# Patient Record
Sex: MALE | Race: WHITE | NOT HISPANIC OR LATINO | Employment: OTHER | URBAN - METROPOLITAN AREA
[De-identification: names, ages, dates, MRNs, and addresses within clinical notes are randomized per-mention and may not be internally consistent; named-entity substitution may affect disease eponyms.]

---

## 2021-07-09 ENCOUNTER — HOSPITAL ENCOUNTER (OUTPATIENT)
Dept: CT IMAGING | Facility: CLINIC | Age: 76
Discharge: HOME | End: 2021-07-09
Payer: COMMERCIAL

## 2021-07-09 DIAGNOSIS — Z13.9 ENCOUNTER FOR SCREENING: ICD-10-CM

## 2021-07-09 LAB
CV CALCIUM SCORE AGATSTON LM: 0
CV CALCIUM SCORING AGATSON LAD: 9
CV CALCIUM SCORING AGATSTON CX: 0
CV CALCIUM SCORING AGATSTON RCA: 0
CV CALCIUM SCORING AGATSTON TOTAL: 9

## 2021-08-08 ENCOUNTER — HEALTH MAINTENANCE LETTER (OUTPATIENT)
Age: 76
End: 2021-08-08

## 2021-10-03 ENCOUNTER — HEALTH MAINTENANCE LETTER (OUTPATIENT)
Age: 76
End: 2021-10-03

## 2022-09-11 ENCOUNTER — HEALTH MAINTENANCE LETTER (OUTPATIENT)
Age: 77
End: 2022-09-11

## 2023-06-05 ENCOUNTER — APPOINTMENT (OUTPATIENT)
Dept: RADIOLOGY | Facility: CLINIC | Age: 78
End: 2023-06-05
Attending: EMERGENCY MEDICINE
Payer: COMMERCIAL

## 2023-06-05 ENCOUNTER — HOSPITAL ENCOUNTER (EMERGENCY)
Facility: CLINIC | Age: 78
Discharge: HOME | End: 2023-06-06
Attending: EMERGENCY MEDICINE | Admitting: EMERGENCY MEDICINE
Payer: COMMERCIAL

## 2023-06-05 DIAGNOSIS — I48.91 ATRIAL FIBRILLATION WITH RVR (H): ICD-10-CM

## 2023-06-05 LAB
ALBUMIN UR-MCNC: NEGATIVE MG/DL
APPEARANCE UR: CLEAR
ATRIAL RATE - MUSE: 416 BPM
ATRIAL RATE - MUSE: 441 BPM
BASOPHILS # BLD AUTO: 0 10E3/UL (ref 0–0.2)
BASOPHILS NFR BLD AUTO: 0 %
BILIRUB UR QL STRIP: NEGATIVE
COLOR UR AUTO: YELLOW
D DIMER PPP FEU-MCNC: <=0.27 UG/ML FEU (ref 0–0.5)
DIASTOLIC BLOOD PRESSURE - MUSE: 77 MMHG
DIASTOLIC BLOOD PRESSURE - MUSE: 82 MMHG
EOSINOPHIL # BLD AUTO: 0.2 10E3/UL (ref 0–0.7)
EOSINOPHIL NFR BLD AUTO: 2 %
ERYTHROCYTE [DISTWIDTH] IN BLOOD BY AUTOMATED COUNT: 12.7 % (ref 10–15)
GLUCOSE UR STRIP-MCNC: NEGATIVE MG/DL
HCT VFR BLD AUTO: 43.8 % (ref 40–53)
HGB BLD-MCNC: 15.2 G/DL (ref 13.3–17.7)
HGB UR QL STRIP: NEGATIVE
IMM GRANULOCYTES # BLD: 0 10E3/UL
IMM GRANULOCYTES NFR BLD: 0 %
INR PPP: 0.94 (ref 0.85–1.15)
INTERPRETATION ECG - MUSE: NORMAL
INTERPRETATION ECG - MUSE: NORMAL
KETONES UR STRIP-MCNC: NEGATIVE MG/DL
LEUKOCYTE ESTERASE UR QL STRIP: NEGATIVE
LYMPHOCYTES # BLD AUTO: 4.2 10E3/UL (ref 0.8–5.3)
LYMPHOCYTES NFR BLD AUTO: 42 %
MCH RBC QN AUTO: 31.7 PG (ref 26.5–33)
MCHC RBC AUTO-ENTMCNC: 34.7 G/DL (ref 31.5–36.5)
MCV RBC AUTO: 91 FL (ref 78–100)
MONOCYTES # BLD AUTO: 0.7 10E3/UL (ref 0–1.3)
MONOCYTES NFR BLD AUTO: 7 %
MUCOUS THREADS #/AREA URNS LPF: PRESENT /LPF
NEUTROPHILS # BLD AUTO: 4.8 10E3/UL (ref 1.6–8.3)
NEUTROPHILS NFR BLD AUTO: 49 %
NITRATE UR QL: NEGATIVE
NRBC # BLD AUTO: 0 10E3/UL
NRBC BLD AUTO-RTO: 0 /100
P AXIS - MUSE: NORMAL DEGREES
P AXIS - MUSE: NORMAL DEGREES
PH UR STRIP: 5.5 [PH] (ref 5–7)
PLATELET # BLD AUTO: 247 10E3/UL (ref 150–450)
PR INTERVAL - MUSE: NORMAL MS
PR INTERVAL - MUSE: NORMAL MS
QRS DURATION - MUSE: 96 MS
QRS DURATION - MUSE: 96 MS
QT - MUSE: 344 MS
QT - MUSE: 368 MS
QTC - MUSE: 442 MS
QTC - MUSE: 452 MS
R AXIS - MUSE: 3 DEGREES
R AXIS - MUSE: 8 DEGREES
RBC # BLD AUTO: 4.8 10E6/UL (ref 4.4–5.9)
RBC URINE: <1 /HPF
SP GR UR STRIP: 1.02 (ref 1–1.03)
SQUAMOUS EPITHELIAL: <1 /HPF
SYSTOLIC BLOOD PRESSURE - MUSE: 137 MMHG
SYSTOLIC BLOOD PRESSURE - MUSE: 173 MMHG
T AXIS - MUSE: 51 DEGREES
T AXIS - MUSE: 72 DEGREES
TROPONIN I SERPL-MCNC: 0.01 NG/ML (ref 0–0.29)
TSH SERPL DL<=0.005 MIU/L-ACNC: 1.12 UIU/ML (ref 0.3–5)
UROBILINOGEN UR STRIP-MCNC: 2 MG/DL
VENTRICULAR RATE- MUSE: 104 BPM
VENTRICULAR RATE- MUSE: 87 BPM
WBC # BLD AUTO: 10 10E3/UL (ref 4–11)
WBC URINE: 2 /HPF

## 2023-06-05 PROCEDURE — 36415 COLL VENOUS BLD VENIPUNCTURE: CPT | Performed by: EMERGENCY MEDICINE

## 2023-06-05 PROCEDURE — 93005 ELECTROCARDIOGRAM TRACING: CPT | Performed by: EMERGENCY MEDICINE

## 2023-06-05 PROCEDURE — 71046 X-RAY EXAM CHEST 2 VIEWS: CPT

## 2023-06-05 PROCEDURE — 250N000013 HC RX MED GY IP 250 OP 250 PS 637: Performed by: EMERGENCY MEDICINE

## 2023-06-05 PROCEDURE — 81001 URINALYSIS AUTO W/SCOPE: CPT | Performed by: EMERGENCY MEDICINE

## 2023-06-05 PROCEDURE — 85379 FIBRIN DEGRADATION QUANT: CPT | Performed by: EMERGENCY MEDICINE

## 2023-06-05 PROCEDURE — 250N000009 HC RX 250: Performed by: EMERGENCY MEDICINE

## 2023-06-05 PROCEDURE — 99285 EMERGENCY DEPT VISIT HI MDM: CPT | Mod: 25

## 2023-06-05 PROCEDURE — 84484 ASSAY OF TROPONIN QUANT: CPT | Performed by: EMERGENCY MEDICINE

## 2023-06-05 PROCEDURE — 85014 HEMATOCRIT: CPT | Performed by: EMERGENCY MEDICINE

## 2023-06-05 PROCEDURE — 83735 ASSAY OF MAGNESIUM: CPT | Performed by: EMERGENCY MEDICINE

## 2023-06-05 PROCEDURE — 85610 PROTHROMBIN TIME: CPT | Performed by: EMERGENCY MEDICINE

## 2023-06-05 PROCEDURE — 80048 BASIC METABOLIC PNL TOTAL CA: CPT | Performed by: EMERGENCY MEDICINE

## 2023-06-05 PROCEDURE — 96374 THER/PROPH/DIAG INJ IV PUSH: CPT

## 2023-06-05 PROCEDURE — 84443 ASSAY THYROID STIM HORMONE: CPT | Performed by: EMERGENCY MEDICINE

## 2023-06-05 RX ORDER — METOPROLOL TARTRATE 25 MG/1
25 TABLET, FILM COATED ORAL 2 TIMES DAILY
Qty: 60 TABLET | Refills: 0 | Status: SHIPPED | OUTPATIENT
Start: 2023-06-05 | End: 2023-06-14

## 2023-06-05 RX ORDER — METOPROLOL TARTRATE 25 MG/1
50 TABLET, FILM COATED ORAL ONCE
Status: COMPLETED | OUTPATIENT
Start: 2023-06-05 | End: 2023-06-05

## 2023-06-05 RX ORDER — METOPROLOL TARTRATE 1 MG/ML
5 INJECTION, SOLUTION INTRAVENOUS ONCE
Status: COMPLETED | OUTPATIENT
Start: 2023-06-05 | End: 2023-06-05

## 2023-06-05 RX ORDER — LISINOPRIL AND HYDROCHLOROTHIAZIDE 12.5; 2 MG/1; MG/1
2 TABLET ORAL DAILY
COMMUNITY
Start: 2023-01-18 | End: 2024-07-10

## 2023-06-05 RX ORDER — ASPIRIN 325 MG
325 TABLET ORAL ONCE
Status: COMPLETED | OUTPATIENT
Start: 2023-06-05 | End: 2023-06-05

## 2023-06-05 RX ADMIN — METOPROLOL TARTRATE 50 MG: 25 TABLET, FILM COATED ORAL at 23:11

## 2023-06-05 RX ADMIN — ASPIRIN 325 MG ORAL TABLET 325 MG: 325 PILL ORAL at 21:40

## 2023-06-05 RX ADMIN — METOPROLOL TARTRATE 5 MG: 5 INJECTION INTRAVENOUS at 21:40

## 2023-06-05 ASSESSMENT — ACTIVITIES OF DAILY LIVING (ADL)
ADLS_ACUITY_SCORE: 35
ADLS_ACUITY_SCORE: 35

## 2023-06-06 VITALS
OXYGEN SATURATION: 97 % | HEART RATE: 79 BPM | WEIGHT: 240 LBS | DIASTOLIC BLOOD PRESSURE: 70 MMHG | TEMPERATURE: 97.5 F | SYSTOLIC BLOOD PRESSURE: 123 MMHG | RESPIRATION RATE: 17 BRPM

## 2023-06-06 LAB
ANION GAP SERPL CALCULATED.3IONS-SCNC: 11 MMOL/L (ref 5–18)
BUN SERPL-MCNC: 16 MG/DL (ref 8–28)
CALCIUM SERPL-MCNC: 9.7 MG/DL (ref 8.5–10.5)
CHLORIDE BLD-SCNC: 102 MMOL/L (ref 98–107)
CO2 SERPL-SCNC: 26 MMOL/L (ref 22–31)
CREAT SERPL-MCNC: 1 MG/DL (ref 0.7–1.3)
GFR SERPL CREATININE-BSD FRML MDRD: 78 ML/MIN/1.73M2
GLUCOSE BLD-MCNC: 137 MG/DL (ref 70–125)
MAGNESIUM SERPL-MCNC: 2.1 MG/DL (ref 1.8–2.6)
POTASSIUM BLD-SCNC: 4.4 MMOL/L (ref 3.5–5)
SODIUM SERPL-SCNC: 139 MMOL/L (ref 136–145)

## 2023-06-06 PROCEDURE — 250N000013 HC RX MED GY IP 250 OP 250 PS 637: Performed by: EMERGENCY MEDICINE

## 2023-06-06 RX ADMIN — APIXABAN 5 MG: 5 TABLET, FILM COATED ORAL at 00:06

## 2023-06-06 NOTE — ED PROVIDER NOTES
EMERGENCY DEPARTMENT ENCOUnter      NAME: Daquan Schmitt  AGE: 77 year old male  YOB: 1945  MRN: [id number]  EVALUATION DATE & TIME: 6/5/2023  8:55 PM    PCP: Jeffrey Murillo    ED PROVIDER: Yin Dexter MD      Chief Complaint   Patient presents with     Hypertension         FINAL IMPRESSION:  1. Atrial fibrillation with RVR (H)          ED COURSE & MEDICAL DECISION MAKING:      In summary, the patient is a 77-year-old male that presents to the emergency department for evaluation of a rapid heart rate thought secondary to new atrial fibrillation with a rapid ventricular response.  Rate control was achieved in the emergency department, however the patient remained in atrial fibrillation.  Since it is unclear when his atrial fibrillation started, I do not think he is a candidate for emergent cardioversion at this time.  He has no evidence of ACS, thromboembolism, or thyroid dysfunction that would trigger his atrial fibrillation.   CHADS2 score was 2, hence we will initiate anticoagulant therapy with eliquis.  9:07 PM I met with the patient for an interview and initial exam. Plans for treatment were discussed.  Lopressor 5 mg IV was administered.  Aspirin 325 mg p.o. was administered.  Previous medical records were reviewed, including nurse call notes.  11:30 AM Discussed anticoagulation therapy with patient, which he agrees to initiate.  Eliquis 5 mg po administered.  Lopressor 50 mg p.o. was administered.  12:28 AM I updated the patient regarding her lab, EKG, and imaging findings. Plans for discharge were discussed. He is comfortable with the plan    Medical Decision Making    History:    Supplemental history from: Documented in chart, if applicable    External Record(s) reviewed: Documented in chart, if applicable. and Outpatient Record: [facility] 01/18/2023    Work Up:    Chart documentation includes differential considered and any EKGs or imaging  independently interpreted by provider, where specified.    In additional to work up documented, I considered the following work up: Documented in chart, if applicable.    External consultation:    Discussion of management with another provider: Documented in chart, if applicable    Complicating factors:    Care impacted by chronic illness: Hypertension    Care affected by social determinants of health: N/A    Disposition considerations: Discharge. I prescribed additional prescription strength medication(s) as charted. See documentation for any additional details.          At the conclusion of the encounter I discussed the results of all of the tests and the disposition. The questions were answered. The patient or family acknowledged understanding and was agreeable with the care plan.         MEDICATIONS GIVEN IN THE EMERGENCY:  Medications   metoprolol (LOPRESSOR) injection 5 mg (5 mg Intravenous $Given 6/5/23 2140)   aspirin (ASA) tablet 325 mg (325 mg Oral $Given 6/5/23 2140)   metoprolol tartrate (LOPRESSOR) tablet 50 mg (50 mg Oral $Given 6/5/23 2311)   apixaban ANTICOAGULANT (ELIQUIS) tablet 5 mg (5 mg Oral $Given 6/6/23 0006)       NEW PRESCRIPTIONS STARTED AT TODAY'S ER VISIT  Discharge Medication List as of 6/6/2023 12:39 AM      START taking these medications    Details   apixaban ANTICOAGULANT (ELIQUIS ANTICOAGULANT) 5 MG tablet Take 1 tablet (5 mg) by mouth 2 times daily for 30 days, Disp-60 tablet, R-0, E-Prescribe      metoprolol tartrate (LOPRESSOR) 25 MG tablet Take 1 tablet (25 mg) by mouth 2 times daily for 30 days, Disp-60 tablet, R-0, E-Prescribe                =================================================================    HPI        Daquan Schmitt is a 77 year old male with a pertinent history of hypertension who presents to this ED by walk-in for evaluation of hypertension.    Per chart review, the patient was seen at [facility] on 01/18/2023 for  blood pressure management. He was discontinued on furosemide and lisinopril. Instead, the patient will start lisnopril/ hydrochlorothiaide 2 tablets daily. Patient was discharged and encouraged to notify the hospital back if his blood pressure is not controlled.      The patient was concerned of his elevated heart rate throughout the day with associated symptoms of a mild headache. Otherwise, he reports feeling quite well in the ED. The patient reported having a BP of 120/70 with a HR of 87 earlier today. He notes his HR usually runs around low 50's. The patient proceeded through the day and recorded his BP and HR around 8 PM. BP was elevated at 150/80 with HR of 90s. He recorded his vitals again prior to arrival with a HR of 112. The patient is currently taking lisinopril with a diuretic and cholesterol medication. He also reports taking tylenol and ibuprofen throughout the day for a spine issue. He denied any medical history of a-fib or any other heart illness.     Otherwise, the patient denied having any new pain, shortness of breath, fever, nausea, vomiting, diarrhea, palpitations, and any other medical complaints or concerns at this time.    REVIEW OF SYSTEMS     Constitutional:  Denies fever or chills  HENT:  Denies sore throat   Respiratory:  Denies cough or shortness of breath   Cardiovascular:  Denies chest pain or palpitations  GI:  Denies abdominal pain, nausea, or vomiting  Musculoskeletal:  Denies any new extremity pain   Skin:  Denies rash   Neurologic:  Denies focal weakness or sensory changes. Positive for mild headache  All other systems reviewed and are negative      PAST MEDICAL HISTORY:  History reviewed. No pertinent past medical history.    PAST SURGICAL HISTORY:  History reviewed. No pertinent surgical history.        CURRENT MEDICATIONS:    apixaban ANTICOAGULANT (ELIQUIS ANTICOAGULANT) 5 MG tablet  lisinopril-hydrochlorothiazide (ZESTORETIC) 20-12.5 MG tablet  metoprolol tartrate (LOPRESSOR)  25 MG tablet  finasteride (PROSCAR) 5 MG tablet  pravastatin (PRAVACHOL) 20 MG tablet  terbinafine (LAMISIL) 250 MG tablet        ALLERGIES:  Allergies   Allergen Reactions     Pcn [Penicillins] Diarrhea       FAMILY HISTORY:  No family history on file.    SOCIAL HISTORY:   Social History     Socioeconomic History     Marital status:      Spouse name: None     Number of children: None     Years of education: None     Highest education level: None       VITALS:  Patient Vitals for the past 24 hrs:   BP Temp Temp src Pulse Resp SpO2 Weight   06/06/23 0026 -- -- -- 79 17 97 % --   06/06/23 0011 123/70 -- -- 78 15 96 % --   06/06/23 0000 (!) 143/73 -- -- 80 16 97 % --   06/05/23 2356 -- -- -- 80 19 98 % --   06/05/23 2348 135/62 -- -- 85 26 97 % --   06/05/23 2341 -- -- -- 86 14 97 % --   06/05/23 2330 134/63 -- -- 82 12 96 % --   06/05/23 2300 133/63 -- -- 85 12 97 % --   06/05/23 2245 136/67 -- -- 83 21 98 % --   06/05/23 2230 (!) 163/83 -- -- 84 -- 98 % --   06/05/23 2225 (!) 155/88 -- -- 96 17 92 % --   06/05/23 2200 137/78 -- -- 82 13 98 % --   06/05/23 2155 138/74 -- -- 78 20 99 % --   06/05/23 2150 (!) 144/79 -- -- 78 10 98 % --   06/05/23 2145 (!) 162/91 -- -- 89 -- 97 % --   06/05/23 2140 (!) 149/80 -- -- 96 21 99 % --   06/05/23 2130 (!) 149/82 -- -- 96 19 97 % --   06/05/23 2115 (!) 187/82 -- -- 94 28 99 % --   06/05/23 2100 (!) 173/82 -- -- 102 19 99 % --   06/05/23 2058 (!) 170/83 97.5  F (36.4  C) Oral 101 18 98 % 108.9 kg (240 lb)       PHYSICAL EXAM    Constitutional:  Well developed, Well nourished,  HENT:  Normocephalic, Atraumatic, Bilateral external ears normal, Oropharynx moist, Nose normal.   Neck:  Normal range of motion, No meningismus, No stridor.   Eyes:  EOMI, Conjunctiva normal, No discharge.   Respiratory:  Normal breath sounds, No respiratory distress, No wheezing, No chest tenderness.   Cardiovascular: Tachycardia, irregularly irregular rhythm, No murmurs  GI:  Soft, No tenderness,  No guarding,   Musculoskeletal:  Neurovascularly intact distally, No edema, No tenderness, No cyanosis, Good range of motion in all major joints.   Integument:  Warm, Dry, No erythema, No rash.   Lymphatic:  No lymphadenopathy noted.   Neurologic:  Alert & oriented , Normal motor function,  No focal deficits noted.   Psychiatric:  Affect normal, Judgment normal, Mood normal.      LAB:  All pertinent labs reviewed and interpreted.  Results for orders placed or performed during the hospital encounter of 06/05/23   Chest XR,  PA & LAT    Impression    IMPRESSION: Normal heart size and pulmonary vascularity. Lungs are clear. Mild elevation right hemidiaphragm. No significant bony abnormalities. Chest is otherwise negative. No acute findings.   D dimer quantitative   Result Value Ref Range    D-Dimer Quantitative <=0.27 0.00 - 0.50 ug/mL FEU   Basic metabolic panel   Result Value Ref Range    Sodium 139 136 - 145 mmol/L    Potassium 4.4 3.5 - 5.0 mmol/L    Chloride 102 98 - 107 mmol/L    Carbon Dioxide (CO2) 26 22 - 31 mmol/L    Anion Gap 11 5 - 18 mmol/L    Urea Nitrogen 16 8 - 28 mg/dL    Creatinine 1.00 0.70 - 1.30 mg/dL    Calcium 9.7 8.5 - 10.5 mg/dL    Glucose 137 (H) 70 - 125 mg/dL    GFR Estimate 78 >60 mL/min/1.73m2   Result Value Ref Range    Troponin I 0.01 0.00 - 0.29 ng/mL   Result Value Ref Range    Magnesium 2.1 1.8 - 2.6 mg/dL   UA with Microscopic reflex to Culture    Specimen: Urine, Midstream   Result Value Ref Range    Color Urine Yellow Colorless, Straw, Light Yellow, Yellow    Appearance Urine Clear Clear    Glucose Urine Negative Negative mg/dL    Bilirubin Urine Negative Negative    Ketones Urine Negative Negative mg/dL    Specific Gravity Urine 1.025 1.001 - 1.030    Blood Urine Negative Negative    pH Urine 5.5 5.0 - 7.0    Protein Albumin Urine Negative Negative mg/dL    Urobilinogen Urine 2.0 (A) <2.0 mg/dL    Nitrite Urine Negative Negative    Leukocyte Esterase Urine Negative Negative     Mucus Urine Present (A) None Seen /LPF    RBC Urine <1 <=2 /HPF    WBC Urine 2 <=5 /HPF    Squamous Epithelials Urine <1 <=1 /HPF   Result Value Ref Range    INR 0.94 0.85 - 1.15   TSH with free T4 reflex   Result Value Ref Range    TSH 1.12 0.30 - 5.00 uIU/mL   CBC with platelets and differential   Result Value Ref Range    WBC Count 10.0 4.0 - 11.0 10e3/uL    RBC Count 4.80 4.40 - 5.90 10e6/uL    Hemoglobin 15.2 13.3 - 17.7 g/dL    Hematocrit 43.8 40.0 - 53.0 %    MCV 91 78 - 100 fL    MCH 31.7 26.5 - 33.0 pg    MCHC 34.7 31.5 - 36.5 g/dL    RDW 12.7 10.0 - 15.0 %    Platelet Count 247 150 - 450 10e3/uL    % Neutrophils 49 %    % Lymphocytes 42 %    % Monocytes 7 %    % Eosinophils 2 %    % Basophils 0 %    % Immature Granulocytes 0 %    NRBCs per 100 WBC 0 <1 /100    Absolute Neutrophils 4.8 1.6 - 8.3 10e3/uL    Absolute Lymphocytes 4.2 0.8 - 5.3 10e3/uL    Absolute Monocytes 0.7 0.0 - 1.3 10e3/uL    Absolute Eosinophils 0.2 0.0 - 0.7 10e3/uL    Absolute Basophils 0.0 0.0 - 0.2 10e3/uL    Absolute Immature Granulocytes 0.0 <=0.4 10e3/uL    Absolute NRBCs 0.0 10e3/uL   ECG 12-LEAD WITH MUSE (LHE)   Result Value Ref Range    Systolic Blood Pressure 173 mmHg    Diastolic Blood Pressure 82 mmHg    Ventricular Rate 104 BPM    Atrial Rate 416 BPM    KS Interval  ms    QRS Duration 96 ms     ms    QTc 452 ms    P Axis  degrees    R AXIS 3 degrees    T Axis 72 degrees    Interpretation ECG       Atrial fibrillation with rapid ventricular response  Nonspecific T wave abnormality  Abnormal ECG  No previous ECGs available  Confirmed by SEE ED PROVIDER NOTE FOR, ECG INTERPRETATION ([id number]),  EN NICHOLE ([id number]) on 6/5/2023 11:45:34 PM     ECG 12-LEAD WITH MUSE (LHE)   Result Value Ref Range    Systolic Blood Pressure 137 mmHg    Diastolic Blood Pressure 77 mmHg    Ventricular Rate 87 BPM    Atrial Rate 441 BPM    KS Interval  ms    QRS Duration 96 ms     ms    QTc 442 ms    P Axis  degrees    R AXIS 8  degrees    T Axis 51 degrees    Interpretation ECG       Atrial fibrillation  Abnormal ECG  When compared with ECG of 2023 21:02,  No significant change was found  Confirmed by SEE ED PROVIDER NOTE FOR, ECG INTERPRETATION ([id number]),  EN NICHOLE ([id number]) on 2023 11:45:12 PM         RADIOLOGY:  I have independently reviewed and interpreted the above imaging, pending the final radiology read.  Chest XR,  PA & LAT   Final Result   IMPRESSION: Normal heart size and pulmonary vascularity. Lungs are clear. Mild elevation right hemidiaphragm. No significant bony abnormalities. Chest is otherwise negative. No acute findings.          EK-rate is 104, atrial flutter, no ST segment elevation or depression is appreciated.  No previous EKGs for comparison    -rate is 87, atrial fibrillation, no ST segment elevation or depression appreciated.  Rate is improved from previous EKG.  I have independently reviewed and interpreted this EKG          I, Harley Taylor, am serving as a scribe to document services personally performed by Dr. Dexter based on my observation and the provider's statements to me. I, Yin Dexter MD attest that Harley Taylor is acting in a scribe capacity, has observed my performance of the services and has documented them in accordance with my direction.    Yin Dexter MD  Emergency Medicine  [facility] EMERGENCY ROOM  [address] [id number] [phone]  Dept: [phone]       Yin Dexter MD  [phone]

## 2023-06-06 NOTE — ED TRIAGE NOTES
States he took his bp at home approx 1 hour ago and it was 150s systolic with . Does take bp medications. Noted a headache at that time. Denies chest pain, shortness of breath, vision changes.

## 2023-06-06 NOTE — DISCHARGE INSTRUCTIONS
Continue your other medications as previously prescribed  Return to the ER for worsening problems or concerns

## 2023-06-14 ENCOUNTER — OFFICE VISIT (OUTPATIENT)
Dept: CARDIOLOGY | Facility: CLINIC | Age: 78
End: 2023-06-14
Attending: EMERGENCY MEDICINE
Payer: COMMERCIAL

## 2023-06-14 VITALS
HEART RATE: 76 BPM | WEIGHT: 248 LBS | BODY MASS INDEX: 31.83 KG/M2 | RESPIRATION RATE: 16 BRPM | SYSTOLIC BLOOD PRESSURE: 139 MMHG | DIASTOLIC BLOOD PRESSURE: 84 MMHG | HEIGHT: 74 IN

## 2023-06-14 DIAGNOSIS — I48.91 ATRIAL FIBRILLATION WITH RVR (H): Primary | ICD-10-CM

## 2023-06-14 PROBLEM — H90.3 SENSORINEURAL HEARING LOSS (SNHL) OF BOTH EARS: Status: ACTIVE | Noted: 2018-07-10

## 2023-06-14 PROBLEM — Z91.09 ENVIRONMENTAL ALLERGIES: Status: ACTIVE | Noted: 2023-06-14

## 2023-06-14 PROBLEM — E78.5 HYPERLIPIDEMIA WITH TARGET LDL LESS THAN 100: Status: ACTIVE | Noted: 2023-06-14

## 2023-06-14 PROBLEM — I10 HYPERTENSION: Status: ACTIVE | Noted: 2023-06-14

## 2023-06-14 PROBLEM — G47.33 OSA (OBSTRUCTIVE SLEEP APNEA): Status: ACTIVE | Noted: 2023-06-14

## 2023-06-14 PROCEDURE — 99205 OFFICE O/P NEW HI 60 MIN: CPT | Performed by: INTERNAL MEDICINE

## 2023-06-14 RX ORDER — PRAVASTATIN SODIUM 40 MG
40 TABLET ORAL DAILY
COMMUNITY
Start: 2023-05-24

## 2023-06-14 RX ORDER — ASPIRIN 325 MG
160 TABLET ORAL DAILY
COMMUNITY
End: 2023-07-10

## 2023-06-14 RX ORDER — SILDENAFIL CITRATE 20 MG/1
1 TABLET ORAL DAILY PRN
COMMUNITY
Start: 2023-03-04

## 2023-06-14 RX ORDER — METOPROLOL TARTRATE 25 MG/1
25 TABLET, FILM COATED ORAL 2 TIMES DAILY
Qty: 60 TABLET | Refills: 3 | Status: SHIPPED | OUTPATIENT
Start: 2023-06-14 | End: 2023-08-16 | Stop reason: ALTCHOICE

## 2023-06-14 RX ORDER — UBIDECARENONE 100 MG
100 CAPSULE ORAL DAILY
COMMUNITY

## 2023-06-14 RX ORDER — CETIRIZINE HYDROCHLORIDE 10 MG/1
10 TABLET ORAL DAILY
COMMUNITY

## 2023-06-14 NOTE — LETTER
6/14/2023    Carmen Stout, APRN CNP  [address] [place] [address]    RE: Daquan Schmitt       Dear Colleague,     I had the pleasure of seeing Daquan Schmitt in the [facility].    [facility]  Cardiac Electrophysiology     Consultation Note: Charles Rose MD    Primary Care: Carmen Stout MD    Primary Cardiology:       Thank you, Carmen Maurice, for asking the [facility] Cardiac Arrhythmia care team to see Mr. Daquan Schmitt to evaluate treatment options for atrial fibrillation.    Assessment/Recommendations   Assessment:    Persistent atrial fibrillation: Recently diagnosed on 6/5/2023 after patient picked up increasing his heart rates during his routine daily blood pressure checks.  At this point the patient does not have any direct symptoms (i.e. palpitations) of his arrhythmia but may have some symptoms of fatigue.  The patient's rate is well controlled and he was started appropriately on oral antiarrhythmic drug as he has a [id number] score of 4.  The underlying pathophysiology of his condition was discussed with the patient and his wife in detail.  The rationale for anticoagulation as well as treatment options moving forward was also discussed.  At this point I would like to gather further information regarding the patient's clinical status and then pursue cardioversion if the patient remains persistent.  Obstructive sleep apnea: Chronic problem for the patient who is uniformly compliant with his CPAP therapy.  Coronary atherosclerosis, native vessel: The patient has a coronary calcium score of 9 (LAD) and is on statin therapy.  He most recently had a LDL of just over 100.  I would recommend consideration of additional up titration and/or change of statin agent to try and achieve an LDL of 70 or less.    Plan:  Echocardiogram to assess the patient's cardiac chamber size and function.  24-hour Holter monitor to assess the  "patient's rhythm and heart rate response to activities of daily living.  Follow-up in the arrhythmia clinic with the EP RIGO in 4 to 6 weeks.  Plan for cardioversion after the RIGO visit.  Encouraged the patient to purchase a [employer] mobile rigo to be able to monitor his rhythm post cardioversion.                  History of Present Illness/Subjective    Mr. Daquan Schmitt is a 77 year old male with history of hypertension who follows his blood pressure carefully.  He notes that his typical resting heart rates are in the 50s and that on June 5 he noted his heart rate was in the mid 80s.  Recheck sometime later in the day demonstrated heart rate above 100 and he presented to the emergency room where atrial fibrillation was diagnosed.  Patient was started on low-dose beta-blocker and DOAC (apixaban) that day.  The patient reports that his heart rates have remained much the same range.  The patient has no symptomatic palpitations or direct awareness of his arrhythmia.  He does not have any lightheadedness or dyspnea.  He does possibly have some mild fatigue.  He reports no orthopnea, PND, or ankle edema.    ECG:   Personally reviewed.  Tracing from 6/5/2023 demonstrates atrial fibrillation with elevated ventricular response.  No acute changes.    ECHO:   Dated:   Previous study from 2020 () demonstrated normal LV function and mild increase in left atrial size.    Other Studies:   Coronary calcium study: Score = 9 (LAD)    Time spent: 65 minutes spent on the date of the encounter doing chart review, history and exam, documentation and further activities as noted above.       Physical Examination Review of Systems   /84 (BP Location: Right arm, Patient Position: Sitting, Cuff Size: Adult Large)   Pulse 76   Resp 16   Ht 1.867 m (6' 1.5\")   Wt 112.5 kg (248 lb)   BMI 32.28 kg/m      Wt Readings from Last 3 Encounters:   06/05/23 108.9 kg (240 lb)         General     Appearance:   The patient is alert oriented " to person place and situation.    The patient is in no acute distress at the time of my evaluation.   HEENT:  Pupils are equal, round, and reactive to light.  Conjunctiva and sclera are clear.  ENT: Oral mucosa is moist and without redness. No evident nasal discharge.   Neck: Without palpable thyroid or appreciable lymph nodes.   Chest: Symmetric, without deformity.   Lungs:   Clear bilaterally with no rales, rhonchi, or wheezes.     Cardiovascular:   Rhythm is irregular. S1 and S2 are normal. No significant murmur is present. JVP is normal. Lower extremities demonstrate no significant edema. Distal pulses are intact bilaterally.   Abdomen:  Soft, non-tender, and without hepatosplenomegaly. Bowel sounds present.   Extremities: Without deformity.   Skin: Skin is warm, dry, and otherwise intact.   Neurologic: Gait is normal.           A 12 point comprehensive review of systems was negative except as noted.      Medical History  Surgical History Family History Social History   No past medical history on file. No past surgical history on file. No family history on file. Social History     Socioeconomic History    Marital status:      Spouse name: Not on file    Number of children: Not on file    Years of education: Not on file    Highest education level: Not on file   Occupational History    Not on file   Tobacco Use    Smoking status: Not on file    Smokeless tobacco: Not on file   Vaping Use    Vaping status: Not on file   Substance and Sexual Activity    Alcohol use: Not on file    Drug use: Not on file    Sexual activity: Not on file   Other Topics Concern    Not on file   Social History Narrative    Not on file     Social Determinants of Health     Financial Resource Strain: Not on file   Food Insecurity: Not on file   Transportation Needs: Not on file   Physical Activity: Not on file   Stress: Not on file   Social Connections: Not on file   Intimate Partner Violence: Not on file   Housing Stability: Not on  file          Medications  Allergies   Scheduled Meds:  Current Outpatient Medications   Medication Sig Dispense Refill    apixaban ANTICOAGULANT (ELIQUIS ANTICOAGULANT) 5 MG tablet Take 1 tablet (5 mg) by mouth 2 times daily for 30 days 60 tablet 0    finasteride (PROSCAR) 5 MG tablet Take 1 tablet (5 mg) by mouth daily 30 tablet 1    lisinopril-hydrochlorothiazide (ZESTORETIC) 20-12.5 MG tablet Take 2 tablets by mouth daily      metoprolol tartrate (LOPRESSOR) 25 MG tablet Take 1 tablet (25 mg) by mouth 2 times daily for 30 days 60 tablet 0    pravastatin (PRAVACHOL) 20 MG tablet Take 1 tablet (20 mg) by mouth daily 30 tablet 1    terbinafine (LAMISIL) 250 MG tablet Take 1 tablet (250 mg) by mouth daily 42 tablet 0    Allergies   Allergen Reactions    Pcn [Penicillins] Diarrhea         Lab Results    Chemistry/lipid CBC Cardiac Enzymes/BNP/TSH/INR   Lab Results   Component Value Date    BUN 16 06/05/2023     06/05/2023    CO2 26 06/05/2023    Lab Results   Component Value Date    WBC 10.0 06/05/2023    HGB 15.2 06/05/2023    HCT 43.8 06/05/2023    MCV 91 06/05/2023     06/05/2023    @RESUFAST(BMP,CBC,BNP,TSH,  INR)@      Charles Rose MD  Clinical Cardiac Electrophysiologist  [facility]       Thank you for allowing me to participate in the care of your patient.      Sincerely,     Charles Rose MD     [facility]  cc:   Yin Dexter MD  [address] [place] [address]

## 2023-06-14 NOTE — PROGRESS NOTES
[facility] Heart Care  Cardiac Electrophysiology     Consultation Note: Charles Rose MD    Primary Care: Carmen Stout MD    Primary Cardiology:       Thank you, Carmen Maurice, for asking the [facility] Cardiac Arrhythmia care team to see Mr. Daquan Schmitt to evaluate treatment options for atrial fibrillation.    Assessment/Recommendations   Assessment:      Persistent atrial fibrillation: Recently diagnosed on 6/5/2023 after patient picked up increasing his heart rates during his routine daily blood pressure checks.  At this point the patient does not have any direct symptoms (i.e. palpitations) of his arrhythmia but may have some symptoms of fatigue.  The patient's rate is well controlled and he was started appropriately on oral antiarrhythmic drug as he has a [id number] score of 4.  The underlying pathophysiology of his condition was discussed with the patient and his wife in detail.  The rationale for anticoagulation as well as treatment options moving forward was also discussed.  At this point I would like to gather further information regarding the patient's clinical status and then pursue cardioversion if the patient remains persistent.    Obstructive sleep apnea: Chronic problem for the patient who is uniformly compliant with his CPAP therapy.    Coronary atherosclerosis, native vessel: The patient has a coronary calcium score of 9 (LAD) and is on statin therapy.  He most recently had a LDL of just over 100.  I would recommend consideration of additional up titration and/or change of statin agent to try and achieve an LDL of 70 or less.    Plan:    Echocardiogram to assess the patient's cardiac chamber size and function.    24-hour Holter monitor to assess the patient's rhythm and heart rate response to activities of daily living.    Follow-up in the arrhythmia clinic with the EP TERRANCE in 4 to 6 weeks.    Plan for cardioversion after the TERRANCE visit.    Encouraged the  "patient to purchase a Milk mobile rigo to be able to monitor his rhythm post cardioversion.                  History of Present Illness/Subjective    Mr. Daquan Schmitt is a 77 year old male with history of hypertension who follows his blood pressure carefully.  He notes that his typical resting heart rates are in the 50s and that on June 5 he noted his heart rate was in the mid 80s.  Recheck sometime later in the day demonstrated heart rate above 100 and he presented to the emergency room where atrial fibrillation was diagnosed.  Patient was started on low-dose beta-blocker and DOAC (apixaban) that day.  The patient reports that his heart rates have remained much the same range.  The patient has no symptomatic palpitations or direct awareness of his arrhythmia.  He does not have any lightheadedness or dyspnea.  He does possibly have some mild fatigue.  He reports no orthopnea, PND, or ankle edema.    ECG:   Personally reviewed.  Tracing from 6/5/2023 demonstrates atrial fibrillation with elevated ventricular response.  No acute changes.    ECHO:   Dated:   Previous study from 2020 () demonstrated normal LV function and mild increase in left atrial size.    Other Studies:   Coronary calcium study: Score = 9 (LAD)    Time spent: 65 minutes spent on the date of the encounter doing chart review, history and exam, documentation and further activities as noted above.       Physical Examination Review of Systems   /84 (BP Location: Right arm, Patient Position: Sitting, Cuff Size: Adult Large)   Pulse 76   Resp 16   Ht 1.867 m (6' 1.5\")   Wt 112.5 kg (248 lb)   BMI 32.28 kg/m      Wt Readings from Last 3 Encounters:   06/05/23 108.9 kg (240 lb)         General     Appearance:   The patient is alert oriented to person place and situation.    The patient is in no acute distress at the time of my evaluation.   HEENT:  Pupils are equal, round, and reactive to light.  Conjunctiva and sclera are clear.  ENT: " Oral mucosa is moist and without redness. No evident nasal discharge.   Neck: Without palpable thyroid or appreciable lymph nodes.   Chest: Symmetric, without deformity.   Lungs:   Clear bilaterally with no rales, rhonchi, or wheezes.     Cardiovascular:   Rhythm is irregular. S1 and S2 are normal. No significant murmur is present. JVP is normal. Lower extremities demonstrate no significant edema. Distal pulses are intact bilaterally.   Abdomen:  Soft, non-tender, and without hepatosplenomegaly. Bowel sounds present.   Extremities: Without deformity.   Skin: Skin is warm, dry, and otherwise intact.   Neurologic: Gait is normal.           A 12 point comprehensive review of systems was negative except as noted.      Medical History  Surgical History Family History Social History   No past medical history on file. No past surgical history on file. No family history on file. Social History     Socioeconomic History     Marital status:      Spouse name: Not on file     Number of children: Not on file     Years of education: Not on file     Highest education level: Not on file   Occupational History     Not on file   Tobacco Use     Smoking status: Not on file     Smokeless tobacco: Not on file   Vaping Use     Vaping status: Not on file   Substance and Sexual Activity     Alcohol use: Not on file     Drug use: Not on file     Sexual activity: Not on file   Other Topics Concern     Not on file   Social History Narrative     Not on file     Social Determinants of Health     Financial Resource Strain: Not on file   Food Insecurity: Not on file   Transportation Needs: Not on file   Physical Activity: Not on file   Stress: Not on file   Social Connections: Not on file   Intimate Partner Violence: Not on file   Housing Stability: Not on file          Medications  Allergies   Scheduled Meds:  Current Outpatient Medications   Medication Sig Dispense Refill     apixaban ANTICOAGULANT (ELIQUIS ANTICOAGULANT) 5 MG tablet  Take 1 tablet (5 mg) by mouth 2 times daily for 30 days 60 tablet 0     finasteride (PROSCAR) 5 MG tablet Take 1 tablet (5 mg) by mouth daily 30 tablet 1     lisinopril-hydrochlorothiazide (ZESTORETIC) 20-12.5 MG tablet Take 2 tablets by mouth daily       metoprolol tartrate (LOPRESSOR) 25 MG tablet Take 1 tablet (25 mg) by mouth 2 times daily for 30 days 60 tablet 0     pravastatin (PRAVACHOL) 20 MG tablet Take 1 tablet (20 mg) by mouth daily 30 tablet 1     terbinafine (LAMISIL) 250 MG tablet Take 1 tablet (250 mg) by mouth daily 42 tablet 0    Allergies   Allergen Reactions     Pcn [Penicillins] Diarrhea         Lab Results    Chemistry/lipid CBC Cardiac Enzymes/BNP/TSH/INR   Lab Results   Component Value Date    BUN 16 06/05/2023     06/05/2023    CO2 26 06/05/2023    Lab Results   Component Value Date    WBC 10.0 06/05/2023    HGB 15.2 06/05/2023    HCT 43.8 06/05/2023    MCV 91 06/05/2023     06/05/2023    @RESUFAST(BMP,CBC,BNP,TSH,  INR)@      Charles Rose MD  Clinical Cardiac Electrophysiologist  [facility] Cardiology

## 2023-06-20 ENCOUNTER — HOSPITAL ENCOUNTER (OUTPATIENT)
Dept: CARDIOLOGY | Facility: CLINIC | Age: 78
Discharge: HOME | End: 2023-06-20
Attending: INTERNAL MEDICINE | Admitting: INTERNAL MEDICINE
Payer: COMMERCIAL

## 2023-06-20 DIAGNOSIS — I48.91 ATRIAL FIBRILLATION WITH RVR (H): ICD-10-CM

## 2023-06-20 PROCEDURE — 93226 XTRNL ECG REC<48 HR SCAN A/R: CPT

## 2023-06-22 PROCEDURE — 93227 XTRNL ECG REC<48 HR R&I: CPT | Performed by: INTERNAL MEDICINE

## 2023-07-10 ENCOUNTER — OFFICE VISIT (OUTPATIENT)
Dept: FAMILY MEDICINE | Facility: CLINIC | Age: 78
End: 2023-07-10
Attending: EMERGENCY MEDICINE
Payer: COMMERCIAL

## 2023-07-10 VITALS
WEIGHT: 247 LBS | HEIGHT: 72 IN | TEMPERATURE: 97.8 F | BODY MASS INDEX: 33.46 KG/M2 | OXYGEN SATURATION: 96 % | DIASTOLIC BLOOD PRESSURE: 77 MMHG | SYSTOLIC BLOOD PRESSURE: 118 MMHG | HEART RATE: 83 BPM

## 2023-07-10 DIAGNOSIS — I10 PRIMARY HYPERTENSION: Primary | ICD-10-CM

## 2023-07-10 DIAGNOSIS — G47.33 OSA (OBSTRUCTIVE SLEEP APNEA): ICD-10-CM

## 2023-07-10 DIAGNOSIS — E78.5 HYPERLIPIDEMIA WITH TARGET LDL LESS THAN 100: ICD-10-CM

## 2023-07-10 DIAGNOSIS — R73.03 PREDIABETES: ICD-10-CM

## 2023-07-10 DIAGNOSIS — I48.19 PERSISTENT ATRIAL FIBRILLATION (H): ICD-10-CM

## 2023-07-10 PROBLEM — M23.207 OLD TEAR OF MENISCUS OF LEFT KNEE: Status: ACTIVE | Noted: 2023-07-10

## 2023-07-10 PROBLEM — K63.5 POLYP OF COLON: Status: ACTIVE | Noted: 2018-08-29

## 2023-07-10 PROCEDURE — 99204 OFFICE O/P NEW MOD 45 MIN: CPT | Performed by: NURSE PRACTITIONER

## 2023-07-10 NOTE — PROGRESS NOTES
"  Assessment & Plan     Hyperlipidemia with target LDL less than 100      Persistent atrial fibrillation (H)    - Primary Care Referral    Primary hypertension      MARY (obstructive sleep apnea)      Prediabetes      - I reviewed Holter monitor with patient today. He will continue current medications and see Cardiology.   - MARY; continue nightly CPAP machine.   - BP stable on ACE-1. He has plans to get ECHO completed. BMP stable   - LDL above goal range, this was discussed with patient today. He is starting to exercise more since his low back pain feels better. Will recheck in October or with Cardiology. Diet discussed.   - Prior a1c in pre-diabetic range. I offered to recheck today, and he declined. Diet and exercise discussed.            MED REC REQUIRED  Post Medication Reconciliation Status: discharge medications reconciled, continue medications without change  BMI:   Estimated body mass index is 33.09 kg/m  as calculated from the following:    Height as of this encounter: 1.84 m (6' 0.44\").    Weight as of this encounter: 112 kg (247 lb).   Weight management plan: Discussed healthy diet and exercise guidelines        TELLY Ascenc[facility]   Daquan is a 77 year old, presenting for the following health issues:  Hospital F/U (HER 06/05 for hypertension and atrial fibrillation. Had Holter monitor, has not been updated on the results) and Establish Care (Looking for a PCP in the area)    - he is not noticing any heart fluttering. Monitors heart rate on watch. Typically in the 50 range. He used to be an avid runner. Now starting to walk long distances again. HO low back pain with right thigh radicular pain. This is better now.         7/10/2023     8:43 AM   Additional Questions   Roomed by Area F.     HPI             Review of Systems   Constitutional, HEENT, cardiovascular, pulmonary, gi and gu systems are negative, except as otherwise noted.    " "  Objective    /77 (BP Location: Left arm, Patient Position: Sitting, Cuff Size: Adult Regular)   Pulse 83   Temp 97.8  F (36.6  C) (Oral)   Ht 1.84 m (6' 0.44\")   Wt 112 kg (247 lb)   SpO2 96%   BMI 33.09 kg/m    Body mass index is 33.09 kg/m .  Physical Exam   GENERAL: healthy, alert and no distress  CV: regular rate and rhythm, normal S1 S2, no S3 or S4, no murmur, click or rub, no peripheral edema and peripheral pulses strong    Admission on 06/05/2023, Discharged on 06/06/2023   Component Date Value Ref Range Status     Systolic Blood Pressure 06/05/2023 173  mmHg Final     Diastolic Blood Pressure 06/05/2023 82  mmHg Final     Ventricular Rate 06/05/2023 104  BPM Final     Atrial Rate 06/05/2023 416  BPM Final     QRS Duration 06/05/2023 96  ms Final     QT 06/05/2023 344  ms Final     QTc 06/05/2023 452  ms Final     R AXIS 06/05/2023 3  degrees Final     T [place] 06/05/2023 72  degrees Final     Interpretation ECG 06/05/2023    Final                    Value:Atrial fibrillation with rapid ventricular response  Nonspecific T wave abnormality  Abnormal ECG  No previous ECGs available  Confirmed by SEE ED PROVIDER NOTE FOR, ECG INTERPRETATION ([id number]),  EN NICHOLE ([id number]) on 6/5/2023 11:45:34 PM       D-Dimer Quantitative 06/05/2023 <=0.27  0.00 - 0.50 ug/mL FEU Final     Sodium 06/05/2023 139  136 - 145 mmol/L Final     Potassium 06/05/2023 4.4  3.5 - 5.0 mmol/L Final     Chloride 06/05/2023 102  98 - 107 mmol/L Final     Carbon Dioxide (CO2) 06/05/2023 26  22 - 31 mmol/L Final     Anion Gap 06/05/2023 11  5 - 18 mmol/L Final     Urea Nitrogen 06/05/2023 16  8 - 28 mg/dL Final     Creatinine 06/05/2023 1.00  0.70 - 1.30 mg/dL Final     Calcium 06/05/2023 9.7  8.5 - 10.5 mg/dL Final     Glucose 06/05/2023 137 (H)  70 - 125 mg/dL Final     GFR Estimate 06/05/2023 78  >60 mL/min/1.73m2 Final    eGFR calculated using 2021 CKD-EPI equation.     Troponin I 06/05/2023 0.01  0.00 - 0.29 ng/mL Final "     Magnesium 06/05/2023 2.1  1.8 - 2.6 mg/dL Final     Color Urine 06/05/2023 Yellow  Colorless, Straw, Light Yellow, Yellow Final     Appearance Urine 06/05/2023 Clear  Clear Final     Glucose Urine 06/05/2023 Negative  Negative mg/dL Final     Bilirubin Urine 06/05/2023 Negative  Negative Final     Ketones Urine 06/05/2023 Negative  Negative mg/dL Final     Specific Gravity Urine 06/05/2023 1.025  1.001 - 1.030 Final     Blood Urine 06/05/2023 Negative  Negative Final     pH Urine 06/05/2023 5.5  5.0 - 7.0 Final     Protein Albumin Urine 06/05/2023 Negative  Negative mg/dL Final     Urobilinogen Urine 06/05/2023 2.0 (A)  <2.0 mg/dL Final     Nitrite Urine 06/05/2023 Negative  Negative Final     Leukocyte Esterase Urine 06/05/2023 Negative  Negative Final     Mucus Urine 06/05/2023 Present (A)  None Seen /LPF Final     RBC Urine 06/05/2023 <1  <=2 /HPF Final     WBC Urine 06/05/2023 2  <=5 /HPF Final     Squamous Epithelials Urine 06/05/2023 <1  <=1 /HPF Final     INR 06/05/2023 0.94  0.85 - 1.15 Final     TSH 06/05/2023 1.12  0.30 - 5.00 uIU/mL Final     WBC Count 06/05/2023 10.0  4.0 - 11.0 10e3/uL Final     RBC Count 06/05/2023 4.80  4.40 - 5.90 10e6/uL Final     Hemoglobin 06/05/2023 15.2  13.3 - 17.7 g/dL Final     Hematocrit 06/05/2023 43.8  40.0 - 53.0 % Final     MCV 06/05/2023 91  78 - 100 fL Final     MCH 06/05/2023 31.7  26.5 - 33.0 pg Final     MCHC 06/05/2023 34.7  31.5 - 36.5 g/dL Final     RDW 06/05/2023 12.7  10.0 - 15.0 % Final     Platelet Count 06/05/2023 247  150 - 450 10e3/uL Final     % Neutrophils 06/05/2023 49  % Final     % Lymphocytes 06/05/2023 42  % Final     % Monocytes 06/05/2023 7  % Final     % Eosinophils 06/05/2023 2  % Final     % Basophils 06/05/2023 0  % Final     % Immature Granulocytes 06/05/2023 0  % Final     NRBCs per 100 WBC 06/05/2023 0  <1 /100 Final     Absolute Neutrophils 06/05/2023 4.8  1.6 - 8.3 10e3/uL Final     Absolute Lymphocytes 06/05/2023 4.2  0.8 - 5.3  10e3/uL Final     Absolute Monocytes 06/05/2023 0.7  0.0 - 1.3 10e3/uL Final     Absolute Eosinophils 06/05/2023 0.2  0.0 - 0.7 10e3/uL Final     Absolute Basophils 06/05/2023 0.0  0.0 - 0.2 10e3/uL Final     Absolute Immature Granulocytes 06/05/2023 0.0  <=0.4 10e3/uL Final     Absolute NRBCs 06/05/2023 0.0  10e3/uL Final     Systolic Blood Pressure 06/05/2023 137  mmHg Final     Diastolic Blood Pressure 06/05/2023 77  mmHg Final     Ventricular Rate 06/05/2023 87  BPM Final     Atrial Rate 06/05/2023 441  BPM Final     QRS Duration 06/05/2023 96  ms Final     QT 06/05/2023 368  ms Final     QTc 06/05/2023 442  ms Final     R AXIS 06/05/2023 8  degrees Final     T Axis 06/05/2023 51  degrees Final     Interpretation ECG 06/05/2023    Final                    Value:Atrial fibrillation  Abnormal ECG  When compared with ECG of 05-JUN-2023 21:02,  No significant change was found  Confirmed by SEE ED PROVIDER NOTE FOR, ECG INTERPRETATION ([id number]),  EN NICHOLE ([id number]) on 6/5/2023 11:45:12 PM

## 2023-07-25 ENCOUNTER — HOSPITAL ENCOUNTER (OUTPATIENT)
Dept: CARDIOLOGY | Facility: CLINIC | Age: 78
Discharge: HOME | End: 2023-07-25
Attending: INTERNAL MEDICINE | Admitting: INTERNAL MEDICINE
Payer: COMMERCIAL

## 2023-07-25 DIAGNOSIS — I48.91 ATRIAL FIBRILLATION WITH RVR (H): ICD-10-CM

## 2023-07-25 LAB — LVEF ECHO: NORMAL

## 2023-07-25 PROCEDURE — 93306 TTE W/DOPPLER COMPLETE: CPT | Mod: 26 | Performed by: INTERNAL MEDICINE

## 2023-07-25 PROCEDURE — 93306 TTE W/DOPPLER COMPLETE: CPT

## 2023-08-14 PROBLEM — I25.10 NONOBSTRUCTIVE ATHEROSCLEROSIS OF CORONARY ARTERY: Status: ACTIVE | Noted: 2023-08-14

## 2023-08-14 PROBLEM — I48.19 PERSISTENT ATRIAL FIBRILLATION (H): Status: ACTIVE | Noted: 2023-08-14

## 2023-08-14 NOTE — PROGRESS NOTES
[facility] Heart Care  Cardiac Electrophysiology  [address]  [place] [address]   Office: [phone]  Fax: [phone]     HEART CARE ELECTROPHYSIOLOGY FOLLOW UP    Primary Care: Carmen Stout MD    Assessment/Recommendations     Persistent atrial fibrillation: Unclear symptomatic burden. LVEF 55-60% with mild RV systolic dysfunction and moderate LAE by TTE, mild CAD coronary CT 2021.  We discussed the pathophysiology and natural progression of atrial fibrillation.  He is agreeable to proceed with cardioversion for symptomatic clarification  -Results of TTE and Holter monitoring reviewed in detail  -DCCV ordered today  -2 days prior, discontinue metoprolol and start sotalol 80 mg twice a day    [id number] score of 4 for age >75, hypertension, CAD  -Continue Eliquis 5 mg twice a day as ordered for stroke prophylaxis.  He confirms no missed doses in the past >3 weeks.  We discussed current recommendations to remain on oral anticoagulation long-term.  Listed allergy to nickel which may preclude him from consideration of percutaneous LAAC    CAD: By CT calcium screening 2021.  Decreased activity tolerance over the past several months without overt angina, shortness of breath with exertion  -MPI treadmill ordered today given history of mild CAD, family history of coronary artery disease and new onset atrial fibrillation    Essential hypertension: Well managed on current regimen    Obstructive sleep apnea: Consistent use of CPAP    Prediabetes    Follow up: With myself 3 to 4 weeks after cardioversion     History of Present Illness/Subjective    Daquan Schmitt is a 77 year old male with past medical history significant for essential hypertension, CAD, hyperlipidemia, prediabetes, MARY, lumbar radiculopathy. He is seen today for follow-up of persistent atrial fibrillation which was diagnosed June 2023.  LVEF 55-60% with mild RV systolic dysfunction and moderate LAE by TTE,  with persistent AF with largely controlled ventricular response on 24-hour Holter monitoring.  He notes potentially increased fatigue and decreased activity tolerance for the past 1 to 2 months.  He denies overt dyspnea, palpitations, chest discomfort, lightheadedness/dizziness, pedal edema or presyncope.       Data Review     Arrhythmia hx:   Dx/date: AF RVR 2023, noted elevated heart rate when checking blood pressure.  DCCV deferred, started on Eliquis and metoprolol. LVEF 55-60% with mild RV systolic dysfunction and moderate LAE by TTE, with persistent AF with largely controlled ventricular response on 24-hour Holter monitoring  Sx: Currently no direct symptoms  Prior AAD, AV gudelia blocking agents: Metoprolol  Procedures  DCCV: N/A  Ablation: N/A    EK2023: AF  bpm, QRS 96 ms, QT/QTc 344/452 ms  Personally reviewed.     TTE: 2023  The left ventricle is normal in size.  Left ventricular function is normal.The ejection fraction is 55-60%.  The right ventricle is mildly dilated.  Mildly decreased right ventricular systolic function  The left atrium is moderately dilated.  There is mild (1+) mitral regurgitation.  Sclerosis of the aortic valve. Aortic valve not well visualized.    MCOT, CHRISTOPH:     24-hour Holter monitoring done 2023.  Continuous AF average 91 bpm, range  bpm.  Rare ventricular ectopy.  No symptom triggers      I have reviewed and updated the patient's past medical history, allergy list and medication list.          Physical Examination Review of Systems   Vitals: /82 (BP Location: Left arm, Patient Position: Sitting, Cuff Size: Adult Regular)   Pulse 78   Resp 20   Wt 113.8 kg (250 lb 12.8 oz)   BMI 33.60 kg/m      BMI= Body mass index is 33.6 kg/m .    Wt Readings from Last 3 Encounters:   08/15/23 113.8 kg (250 lb 12.8 oz)   07/10/23 112 kg (247 lb)   23 112.5 kg (248 lb)       General   Appearance:   Alert and oriented, in no acute distress.     HEENT:  Normocephalic and atraumatic. Conjunctiva and sclera are clear. Moist oral mucosa.    Neck: No JVP, carotid bruit or obvious thyromegaly.   Lungs:   Respirations unlabored. Clear bilaterally with no rales, rhonchi, or wheezes.     Cardiovascular:   Rhythm is irregular. S1 and S2 are normal. No significant murmur is present. Lower extremities demonstrate no significant edema. Posterior tibial pulses are intact bilaterally.   Extremities: No cyanosis or clubbing   Skin: Skin is warm, dry, and otherwise intact.   Neurologic: Gait not assessed. Mood and affect appropriate.    A 12 point comprehensive review of systems was  negative except as noted.      Medical History  Surgical History Family History Social History   No past medical history on file. No past surgical history on file. Family History   Problem Relation Age of Onset    Myocardial Infarction Mother 54    Aortic aneurysm Father 70    Aortic aneurysm Brother 63    Social History     Socioeconomic History    Marital status:      Spouse name: Not on file    Number of children: Not on file    Years of education: Not on file    Highest education level: Not on file   Occupational History    Not on file   Tobacco Use    Smoking status: Former     Years: 20.00     Types: Cigarettes    Smokeless tobacco: Never   Vaping Use    Vaping Use: Never used   Substance and Sexual Activity    Alcohol use: Not on file    Drug use: Never    Sexual activity: Not on file   Other Topics Concern    Not on file   Social History Narrative    Not on file     Social Determinants of Health     Financial Resource Strain: Not on file   Food Insecurity: Not on file   Transportation Needs: Not on file   Physical Activity: Not on file   Stress: Not on file   Social Connections: Not on file   Intimate Partner Violence: Not on file   Housing Stability: Not on file          Medications  Allergies   Scheduled Meds:  Current Outpatient Medications   Medication Sig Dispense Refill     apixaban ANTICOAGULANT (ELIQUIS ANTICOAGULANT) 5 MG tablet Take 1 tablet (5 mg) by mouth 2 times daily 60 tablet 3    ascorbic acid (VITAMIN C) 500 MG CPCR CR capsule daily      cetirizine (ZYRTEC) 10 MG tablet Take 10 mg by mouth daily      co-enzyme Q-10 100 MG CAPS capsule daily      finasteride (PROSCAR) 5 MG tablet Take 1 tablet (5 mg) by mouth daily 30 tablet 1    lisinopril-hydrochlorothiazide (ZESTORETIC) 20-12.5 MG tablet Take 2 tablets by mouth daily      Melatonin 2.5 MG CHEW Take 2.5 mg by mouth daily      metoprolol tartrate (LOPRESSOR) 25 MG tablet Take 1 tablet (25 mg) by mouth 2 times daily 60 tablet 3    pravastatin (PRAVACHOL) 40 MG tablet Take 40 mg by mouth daily      sildenafil (REVATIO) 20 MG tablet Take 1 tablet by mouth daily as needed (only as needed not every day)      Allergies   Allergen Reactions    Nickel Rash         Lab Results    Chemistry/lipid CBC Cardiac Enzymes/BNP/TSH/INR   Lab Results   Component Value Date    BUN 16 2023     2023    CO2 26 2023    Lab Results   Component Value Date    WBC 10.0 2023    HGB 15.2 2023    HCT 43.8 2023    MCV 91 2023     2023    @RESUFAST(BMP,CBC,BNP,TSH,  INR)@       29 minutes spent reviewing prior records (including documentation, laboratory studies, cardiac testing/imaging), history and physical exam, planning, and subsequent documentation.     This note has been dictated using voice recognition software. Any grammatical, typographical, or context distortions are unintentional and inherent to the software.    Zee Garcia, CNP  Clinical Cardiac Electrophysiology  [facility]  Clinic and [phone]  Fax: [phone]  Electrophysiology Nurses: [phone]

## 2023-08-14 NOTE — H&P (VIEW-ONLY)
[facility] Care  Cardiac Electrophysiology  [address]  [place] [address]   Office: [phone]  Fax: [phone]     HEART CARE ELECTROPHYSIOLOGY FOLLOW UP    Primary Care: Carmen Stout MD    Assessment/Recommendations     Persistent atrial fibrillation: Unclear symptomatic burden. LVEF 55-60% with mild RV systolic dysfunction and moderate LAE by TTE, mild CAD coronary CT 2021.  We discussed the pathophysiology and natural progression of atrial fibrillation.  He is agreeable to proceed with cardioversion for symptomatic clarification  -Results of TTE and Holter monitoring reviewed in detail  -DCCV ordered today  -2 days prior, discontinue metoprolol and start sotalol 80 mg twice a day    [id number] score of 4 for age >75, hypertension, CAD  -Continue Eliquis 5 mg twice a day as ordered for stroke prophylaxis.  He confirms no missed doses in the past >3 weeks.  We discussed current recommendations to remain on oral anticoagulation long-term.  Listed allergy to nickel which may preclude him from consideration of percutaneous LAAC    CAD: By CT calcium screening 2021.  Decreased activity tolerance over the past several months without overt angina, shortness of breath with exertion  -MPI treadmill ordered today given history of mild CAD, family history of coronary artery disease and new onset atrial fibrillation    Essential hypertension: Well managed on current regimen    Obstructive sleep apnea: Consistent use of CPAP    Prediabetes    Follow up: With myself 3 to 4 weeks after cardioversion     History of Present Illness/Subjective    Daquan Schmitt is a 77 year old male with past medical history significant for essential hypertension, CAD, hyperlipidemia, prediabetes, MARY, lumbar radiculopathy. He is seen today for follow-up of persistent atrial fibrillation which was diagnosed June 2023.  LVEF 55-60% with mild RV systolic dysfunction and moderate LAE by TTE,  with persistent AF with largely controlled ventricular response on 24-hour Holter monitoring.  He notes potentially increased fatigue and decreased activity tolerance for the past 1 to 2 months.  He denies overt dyspnea, palpitations, chest discomfort, lightheadedness/dizziness, pedal edema or presyncope.       Data Review     Arrhythmia hx:   Dx/date: AF RVR 2023, noted elevated heart rate when checking blood pressure.  DCCV deferred, started on Eliquis and metoprolol. LVEF 55-60% with mild RV systolic dysfunction and moderate LAE by TTE, with persistent AF with largely controlled ventricular response on 24-hour Holter monitoring  Sx: Currently no direct symptoms  Prior AAD, AV gudelia blocking agents: Metoprolol  Procedures  DCCV: N/A  Ablation: N/A    EK2023: AF  bpm, QRS 96 ms, QT/QTc 344/452 ms  Personally reviewed.     TTE: 2023  The left ventricle is normal in size.  Left ventricular function is normal.The ejection fraction is 55-60%.  The right ventricle is mildly dilated.  Mildly decreased right ventricular systolic function  The left atrium is moderately dilated.  There is mild (1+) mitral regurgitation.  Sclerosis of the aortic valve. Aortic valve not well visualized.    MCOT, CHRISTOPH:     24-hour Holter monitoring done 2023.  Continuous AF average 91 bpm, range  bpm.  Rare ventricular ectopy.  No symptom triggers      I have reviewed and updated the patient's past medical history, allergy list and medication list.          Physical Examination Review of Systems   Vitals: /82 (BP Location: Left arm, Patient Position: Sitting, Cuff Size: Adult Regular)   Pulse 78   Resp 20   Wt 113.8 kg (250 lb 12.8 oz)   BMI 33.60 kg/m      BMI= Body mass index is 33.6 kg/m .    Wt Readings from Last 3 Encounters:   08/15/23 113.8 kg (250 lb 12.8 oz)   07/10/23 112 kg (247 lb)   23 112.5 kg (248 lb)       General   Appearance:   Alert and oriented, in no acute distress.     HEENT:  Normocephalic and atraumatic. Conjunctiva and sclera are clear. Moist oral mucosa.    Neck: No JVP, carotid bruit or obvious thyromegaly.   Lungs:   Respirations unlabored. Clear bilaterally with no rales, rhonchi, or wheezes.     Cardiovascular:   Rhythm is irregular. S1 and S2 are normal. No significant murmur is present. Lower extremities demonstrate no significant edema. Posterior tibial pulses are intact bilaterally.   Extremities: No cyanosis or clubbing   Skin: Skin is warm, dry, and otherwise intact.   Neurologic: Gait not assessed. Mood and affect appropriate.    A 12 point comprehensive review of systems was  negative except as noted.      Medical History  Surgical History Family History Social History   No past medical history on file. No past surgical history on file. Family History   Problem Relation Age of Onset    Myocardial Infarction Mother 54    Aortic aneurysm Father 70    Aortic aneurysm Brother 63    Social History     Socioeconomic History    Marital status:      Spouse name: Not on file    Number of children: Not on file    Years of education: Not on file    Highest education level: Not on file   Occupational History    Not on file   Tobacco Use    Smoking status: Former     Years: 20.00     Types: Cigarettes    Smokeless tobacco: Never   Vaping Use    Vaping Use: Never used   Substance and Sexual Activity    Alcohol use: Not on file    Drug use: Never    Sexual activity: Not on file   Other Topics Concern    Not on file   Social History Narrative    Not on file     Social Determinants of Health     Financial Resource Strain: Not on file   Food Insecurity: Not on file   Transportation Needs: Not on file   Physical Activity: Not on file   Stress: Not on file   Social Connections: Not on file   Intimate Partner Violence: Not on file   Housing Stability: Not on file          Medications  Allergies   Scheduled Meds:  Current Outpatient Medications   Medication Sig Dispense Refill     apixaban ANTICOAGULANT (ELIQUIS ANTICOAGULANT) 5 MG tablet Take 1 tablet (5 mg) by mouth 2 times daily 60 tablet 3    ascorbic acid (VITAMIN C) 500 MG CPCR CR capsule daily      cetirizine (ZYRTEC) 10 MG tablet Take 10 mg by mouth daily      co-enzyme Q-10 100 MG CAPS capsule daily      finasteride (PROSCAR) 5 MG tablet Take 1 tablet (5 mg) by mouth daily 30 tablet 1    lisinopril-hydrochlorothiazide (ZESTORETIC) 20-12.5 MG tablet Take 2 tablets by mouth daily      Melatonin 2.5 MG CHEW Take 2.5 mg by mouth daily      metoprolol tartrate (LOPRESSOR) 25 MG tablet Take 1 tablet (25 mg) by mouth 2 times daily 60 tablet 3    pravastatin (PRAVACHOL) 40 MG tablet Take 40 mg by mouth daily      sildenafil (REVATIO) 20 MG tablet Take 1 tablet by mouth daily as needed (only as needed not every day)      Allergies   Allergen Reactions    Nickel Rash         Lab Results    Chemistry/lipid CBC Cardiac Enzymes/BNP/TSH/INR   Lab Results   Component Value Date    BUN 16 2023     2023    CO2 26 2023    Lab Results   Component Value Date    WBC 10.0 2023    HGB 15.2 2023    HCT 43.8 2023    MCV 91 2023     2023    @RESUFAST(BMP,CBC,BNP,TSH,  INR)@       29 minutes spent reviewing prior records (including documentation, laboratory studies, cardiac testing/imaging), history and physical exam, planning, and subsequent documentation.     This note has been dictated using voice recognition software. Any grammatical, typographical, or context distortions are unintentional and inherent to the software.    Zee Garcia, CNP  Clinical Cardiac Electrophysiology  [facility]  Clinic and [phone]  Fax: [phone]  Electrophysiology Nurses: [phone]

## 2023-08-15 ENCOUNTER — OFFICE VISIT (OUTPATIENT)
Dept: CARDIOLOGY | Facility: CLINIC | Age: 78
End: 2023-08-15
Attending: INTERNAL MEDICINE
Payer: COMMERCIAL

## 2023-08-15 ENCOUNTER — DOCUMENTATION ONLY (OUTPATIENT)
Dept: CARDIOLOGY | Facility: CLINIC | Age: 78
End: 2023-08-15

## 2023-08-15 ENCOUNTER — PREP FOR PROCEDURE (OUTPATIENT)
Dept: CARDIOLOGY | Facility: CLINIC | Age: 78
End: 2023-08-15

## 2023-08-15 VITALS
SYSTOLIC BLOOD PRESSURE: 120 MMHG | BODY MASS INDEX: 33.6 KG/M2 | WEIGHT: 250.8 LBS | DIASTOLIC BLOOD PRESSURE: 82 MMHG | HEART RATE: 78 BPM | RESPIRATION RATE: 20 BRPM

## 2023-08-15 DIAGNOSIS — I10 PRIMARY HYPERTENSION: ICD-10-CM

## 2023-08-15 DIAGNOSIS — R73.03 PREDIABETES: ICD-10-CM

## 2023-08-15 DIAGNOSIS — I48.19 PERSISTENT ATRIAL FIBRILLATION (H): Primary | ICD-10-CM

## 2023-08-15 DIAGNOSIS — I25.10 NONOBSTRUCTIVE ATHEROSCLEROSIS OF CORONARY ARTERY: ICD-10-CM

## 2023-08-15 DIAGNOSIS — G47.33 OSA (OBSTRUCTIVE SLEEP APNEA): ICD-10-CM

## 2023-08-15 PROCEDURE — 99213 OFFICE O/P EST LOW 20 MIN: CPT | Performed by: NURSE PRACTITIONER

## 2023-08-15 RX ORDER — LIDOCAINE 40 MG/G
CREAM TOPICAL
Status: CANCELLED | OUTPATIENT
Start: 2023-08-15

## 2023-08-15 NOTE — Clinical Note
Bruno Valles! This gentleman is interested in OCEANIC-AF. He is currently on Eliquis. He is potentially considering ablation with no plans in the works yet. I advise him that ablation would preclude him from participating in the study. Thank you!

## 2023-08-15 NOTE — PATIENT INSTRUCTIONS
Daquan Schmitt,    It was a pleasure to see you today at the [facility].     My recommendations after this visit include:  -Proceed with cardioversion  -No medication changes today    Instructions for the day of cardioversion:  -This is an outpatient procedure done at [facility].  Plan on being at [facility] for several hours.  -Do not eat or drink anything for 8 hours before your procedure. This includes gum and/or hard candies.  -Take Eliquis the morning of your procedure with sips of water. You need 21 days of continuous use of Eliquis before cardioversion.  -You will need a  to drive you home after the procedure due to receiving sedating medications.     You will get a phone call from my nurse to schedule your cardioversion.     Zee Garcia, CNP  Clinical Cardiac Electrophysiology  [facility]  Clinic and [phone]  Fax: [phone]  Electrophysiology Nurses: [phone]

## 2023-08-15 NOTE — LETTER
8/15/2023    Carmen Stout, APRN CNP  [address] [address]    RE: Daquan Daveyti       Dear Colleague,     I had the pleasure of seeing Daquan Schmitt in the [facility].     [facility]  Cardiac Electrophysiology  [address]  [place] [address]   Office: [phone]  Fax: [phone]     HEART CARE ELECTROPHYSIOLOGY FOLLOW UP    Primary Care: Carmen Stout MD    Assessment/Recommendations     Persistent atrial fibrillation: Unclear symptomatic burden. LVEF 55-60% with mild RV systolic dysfunction and moderate LAE by TTE, mild CAD coronary CT 2021.  We discussed the pathophysiology and natural progression of atrial fibrillation.  He is agreeable to proceed with cardioversion for symptomatic clarification  -Results of TTE and Holter monitoring reviewed in detail  -DCCV ordered today  -2 days prior, discontinue metoprolol and start sotalol 80 mg twice a day    [id number] score of 4 for age >75, hypertension, CAD  -Continue Eliquis 5 mg twice a day as ordered for stroke prophylaxis.  He confirms no missed doses in the past >3 weeks.  We discussed current recommendations to remain on oral anticoagulation long-term.  Listed allergy to nickel which may preclude him from consideration of percutaneous LAAC    CAD: By CT calcium screening 2021.  Decreased activity tolerance over the past several months without overt angina, shortness of breath with exertion  -MPI treadmill ordered today given history of mild CAD, family history of coronary artery disease and new onset atrial fibrillation    Essential hypertension: Well managed on current regimen    Obstructive sleep apnea: Consistent use of CPAP    Prediabetes    Follow up: With myself 3 to 4 weeks after cardioversion     History of Present Illness/Subjective    Daquan Schmitt is a 77 year old male with past medical history significant for essential hypertension, CAD,  hyperlipidemia, prediabetes, MARY, lumbar radiculopathy. He is seen today for follow-up of persistent atrial fibrillation which was diagnosed 2023.  LVEF 55-60% with mild RV systolic dysfunction and moderate LAE by TTE, with persistent AF with largely controlled ventricular response on 24-hour Holter monitoring.  He notes potentially increased fatigue and decreased activity tolerance for the past 1 to 2 months.  He denies overt dyspnea, palpitations, chest discomfort, lightheadedness/dizziness, pedal edema or presyncope.       Data Review     Arrhythmia hx:   Dx/date: AF RVR 2023, noted elevated heart rate when checking blood pressure.  DCCV deferred, started on Eliquis and metoprolol. LVEF 55-60% with mild RV systolic dysfunction and moderate LAE by TTE, with persistent AF with largely controlled ventricular response on 24-hour Holter monitoring  Sx: Currently no direct symptoms  Prior AAD, AV gudelia blocking agents: Metoprolol  Procedures  DCCV: N/A  Ablation: N/A    EK2023: AF  bpm, QRS 96 ms, QT/QTc 344/452 ms  Personally reviewed.     TTE: 2023  The left ventricle is normal in size.  Left ventricular function is normal.The ejection fraction is 55-60%.  The right ventricle is mildly dilated.  Mildly decreased right ventricular systolic function  The left atrium is moderately dilated.  There is mild (1+) mitral regurgitation.  Sclerosis of the aortic valve. Aortic valve not well visualized.    MCOT, CHRISTOPH:     24-hour Holter monitoring done 2023.  Continuous AF average 91 bpm, range  bpm.  Rare ventricular ectopy.  No symptom triggers      I have reviewed and updated the patient's past medical history, allergy list and medication list.          Physical Examination Review of Systems   Vitals: /82 (BP Location: Left arm, Patient Position: Sitting, Cuff Size: Adult Regular)   Pulse 78   Resp 20   Wt 113.8 kg (250 lb 12.8 oz)   BMI 33.60 kg/m      BMI= Body mass index  is 33.6 kg/m .    Wt Readings from Last 3 Encounters:   08/15/23 113.8 kg (250 lb 12.8 oz)   07/10/23 112 kg (247 lb)   06/14/23 112.5 kg (248 lb)       General   Appearance:   Alert and oriented, in no acute distress.    HEENT:  Normocephalic and atraumatic. Conjunctiva and sclera are clear. Moist oral mucosa.    Neck: No JVP, carotid bruit or obvious thyromegaly.   Lungs:   Respirations unlabored. Clear bilaterally with no rales, rhonchi, or wheezes.     Cardiovascular:   Rhythm is irregular. S1 and S2 are normal. No significant murmur is present. Lower extremities demonstrate no significant edema. Posterior tibial pulses are intact bilaterally.   Extremities: No cyanosis or clubbing   Skin: Skin is warm, dry, and otherwise intact.   Neurologic: Gait not assessed. Mood and affect appropriate.    A 12 point comprehensive review of systems was  negative except as noted.      Medical History  Surgical History Family History Social History   No past medical history on file. No past surgical history on file. Family History   Problem Relation Age of Onset    Myocardial Infarction Mother 54    Aortic aneurysm Father 70    Aortic aneurysm Brother 63    Social History     Socioeconomic History    Marital status:      Spouse name: Not on file    Number of children: Not on file    Years of education: Not on file    Highest education level: Not on file   Occupational History    Not on file   Tobacco Use    Smoking status: Former     Years: 20.00     Types: Cigarettes    Smokeless tobacco: Never   Vaping Use    Vaping Use: Never used   Substance and Sexual Activity    Alcohol use: Not on file    Drug use: Never    Sexual activity: Not on file   Other Topics Concern    Not on file   Social History Narrative    Not on file     Social Determinants of Health     Financial Resource Strain: Not on file   Food Insecurity: Not on file   Transportation Needs: Not on file   Physical Activity: Not on file   Stress: Not on file    Social Connections: Not on file   Intimate Partner Violence: Not on file   Housing Stability: Not on file          Medications  Allergies   Scheduled Meds:  Current Outpatient Medications   Medication Sig Dispense Refill    apixaban ANTICOAGULANT (ELIQUIS ANTICOAGULANT) 5 MG tablet Take 1 tablet (5 mg) by mouth 2 times daily 60 tablet 3    ascorbic acid (VITAMIN C) 500 MG CPCR CR capsule daily      cetirizine (ZYRTEC) 10 MG tablet Take 10 mg by mouth daily      co-enzyme Q-10 100 MG CAPS capsule daily      finasteride (PROSCAR) 5 MG tablet Take 1 tablet (5 mg) by mouth daily 30 tablet 1    lisinopril-hydrochlorothiazide (ZESTORETIC) 20-12.5 MG tablet Take 2 tablets by mouth daily      Melatonin 2.5 MG CHEW Take 2.5 mg by mouth daily      metoprolol tartrate (LOPRESSOR) 25 MG tablet Take 1 tablet (25 mg) by mouth 2 times daily 60 tablet 3    pravastatin (PRAVACHOL) 40 MG tablet Take 40 mg by mouth daily      sildenafil (REVATIO) 20 MG tablet Take 1 tablet by mouth daily as needed (only as needed not every day)      Allergies   Allergen Reactions    Nickel Rash         Lab Results    Chemistry/lipid CBC Cardiac Enzymes/BNP/TSH/INR   Lab Results   Component Value Date    BUN 16 2023     2023    CO2 26 2023    Lab Results   Component Value Date    WBC 10.0 2023    HGB 15.2 2023    HCT 43.8 2023    MCV 91 2023     2023    @RESUFAST(BMP,CBC,BNP,TSH,  INR)@       29 minutes spent reviewing prior records (including documentation, laboratory studies, cardiac testing/imaging), history and physical exam, planning, and subsequent documentation.     This note has been dictated using voice recognition software. Any grammatical, typographical, or context distortions are unintentional and inherent to the software.    Zee Garcia, CNP  Clinical Cardiac Electrophysiology  [facility]  Clinic and [phone]  Fax:  [phone]  Electrophysiology Nurses: [phone]          Thank you for allowing me to participate in the care of your patient.      Sincerely,     TELLY HIDALGO [facility]  cc:   Charles Rose MD  [address]  [place] [address]

## 2023-08-15 NOTE — PROGRESS NOTES
H&P  PMD: []  Received [] Card OV: [x]  Date: EK 8/15/23 Sent LM  []  Teach  []   Orders  I [x] P  [x]  Letter []   AC: Eliquis NP Med Review: CHANGES- 2 days prior, discontinue metoprolol and start sotalol 80mg BID    All other AM Meds: Take All       1945  Home:[phone] (home) Cell:[phone] (mobile)  Emergency Contact: Santa Schmitt [phone]  PCP: Carmen Stout, [phone]    +++Important patient information for CSC/Cath Lab staff : None+++    [facility] EP Cath Lab Procedure Order   Procedure: Cardioversion for AF  Ordering Provider: Zee Ngo NP  Date Ordered and Prepped: 8/15/2023 Phyllis Montelongo RN  Anticipated Case Duration:  Standard  Scheduling Needs/Timeframe:  Next Available  Scheduling Contact: Please contact pt to schedule  Cardiology Follow Up Apt s/p: Standard- EP TERRANCE @ 4-6 wks or previously scheduled General Card apt  Current Device/Device Co Needed for Procedure: None NoneNone  Pre-Procedural Testing needed: None  Anesthesia:  General    [facility] EP Cath Lab Prep   H&P:  Compled by cardiology on 8/15 if scheduled within 30 days, pt to schedule with PMD if procedure outside of this timeframe  Pre-op Labs: K if pt taking diuretic medication or hx of low Potassium, Beta HcG if appropriate, and INR if on Warfarin will be ordered AM of procedure and Review of most recent labs, WEL for procedure  T&S Pre-Procedure Review: T&S is not required for DCCV/DFT Testing  Medical Records Pertinent for Procedure:  N/A  Contrast Dye Allergies: None    Allergies   Allergen Reactions     Nickel Rash       Current Outpatient Medications:      apixaban ANTICOAGULANT (ELIQUIS ANTICOAGULANT) 5 MG tablet, Take 1 tablet (5 mg) by mouth 2 times daily, Disp: 60 tablet, Rfl: 3     ascorbic acid (VITAMIN C) 500 MG CPCR CR capsule, daily, Disp: , Rfl:      cetirizine (ZYRTEC) 10 MG tablet, Take 10 mg by mouth daily, Disp: , Rfl:      co-enzyme Q-10 100 MG CAPS capsule, daily, Disp: , Rfl:      finasteride  (PROSCAR) 5 MG tablet, Take 1 tablet (5 mg) by mouth daily, Disp: 30 tablet, Rfl: 1     lisinopril-hydrochlorothiazide (ZESTORETIC) 20-12.5 MG tablet, Take 2 tablets by mouth daily, Disp: , Rfl:      Melatonin 2.5 MG CHEW, Take 2.5 mg by mouth daily, Disp: , Rfl:      metoprolol tartrate (LOPRESSOR) 25 MG tablet, Take 1 tablet (25 mg) by mouth 2 times daily, Disp: 60 tablet, Rfl: 3     pravastatin (PRAVACHOL) 40 MG tablet, Take 40 mg by mouth daily, Disp: , Rfl:      sildenafil (REVATIO) 20 MG tablet, Take 1 tablet by mouth daily as needed (only as needed not every day), Disp: , Rfl:     Documentation Date:8/15/2023 11:52 AM  Phyllis Montelongo RN

## 2023-08-15 NOTE — Clinical Note
DCCV ordered today, on Eliquis for >3 weeks. 2 days prior, discontinue metoprolol and start sotalol 80mg BID. Thank you

## 2023-08-16 ENCOUNTER — DOCUMENTATION ONLY (OUTPATIENT)
Dept: CARDIOLOGY | Facility: CLINIC | Age: 78
End: 2023-08-16
Payer: COMMERCIAL

## 2023-08-16 DIAGNOSIS — I48.19 PERSISTENT ATRIAL FIBRILLATION (H): Primary | ICD-10-CM

## 2023-08-16 RX ORDER — SOTALOL HYDROCHLORIDE 80 MG/1
80 TABLET ORAL 2 TIMES DAILY
Qty: 60 TABLET | Refills: 3 | Status: SHIPPED | OUTPATIENT
Start: 2023-08-16 | End: 2023-09-15 | Stop reason: DRUGHIGH

## 2023-08-17 ENCOUNTER — TELEPHONE (OUTPATIENT)
Dept: CARDIOLOGY | Facility: CLINIC | Age: 78
End: 2023-08-17
Payer: COMMERCIAL

## 2023-08-17 NOTE — TELEPHONE ENCOUNTER
Pre-Procedure Education    Procedure: DCCV with Rosalie Gilman NP on 9/1/2023 with arrival time 8:30 am      PT IS ON ELIQUIS AND NO MISSED DOSES    COVID: COVID policy- if pt develops COVID like symptoms prior to procedure, he/she would need to complete an at home with a rapid antigen COVID test 1-2 days prior to your procedure date. If COVID + pt is aware the procedure will need to be rescheduled, and to contact CV scheduling as soon as possible    Pre-Op H&P: Completed- Available in Epic    Education:     PT HAS A  FOR PROCEDURE THAT WILL STAY WITH HIM    PT INSTRUCTED TO HOLD ANY VITAMINS, MINERALS CALCIUM IRON OR SUPPLEMENTS THE MORNING OF CV  PT INSTRUCTED NO GUM CHEWING MINTS OR CANDY THE MORNING OF CV  PT INSTRUCTED TO LEAVE JEWELRY AT HOME  PT INSTRUCTED TO BATHE OR SHOWER BEFORE COMING IN  PT IS ON ELIQUIS  PT INSTRUCTED PER  BRITTNY WALL CNP TO STOP HIS METOPROLOL THE 2 DAYS PRIOR HIS CV AND START SOTALOL 80 MG Q 12 HRS  PT IS PRE DIABETIC  PT IS MARY/CPAP  PT HAS A POST CV FOLLOW UP WITH BRITTNY 10/6      Contact: Reviewed via phone with pt    Pre-Procedure Instruction: NPO after midnight pre procedure, Defined NPO with pt, Remove all jewelry and leave all valuables at home, Shower prior to arrival, Sedation plan/orders, Transportation requirements and arrangements post procedure, Post-procedure follow up process, Post-procedure restrictions/expectations, and Pre-procedure letter sent- letter tab  Risks:      Medication:   Instructions regarding anticoagulants: Eliquis- To continue anticoagulation uninterrupted through their procedure    Instructions regarding antiarrhythmic medication: Sotalol; Pt instructed to continue medication prior to procedure as prescribed    Instructions for medication, other than anticoagulants and antiarrhythmics listed above, given to pt: to take all morning medications with small sips of water, with the exception of OTC supplements and MVI       Important patient  information for staff:  PT IS NEW ON SOTALOL, PT IS MARY/CPAP PT PRE DIABETIC    8/17/2023 1:18 PM  Griselda Zhong LPN

## 2023-08-25 ENCOUNTER — TELEPHONE (OUTPATIENT)
Dept: CARDIOLOGY | Facility: CLINIC | Age: 78
End: 2023-08-25
Payer: COMMERCIAL

## 2023-08-25 NOTE — TELEPHONE ENCOUNTER
Pt called EPRN line to discuss his upcoming DCCV. Noted pt had received phone education on 8/17 but he reports he is out of town and forgot his notes. Confirmed to switch from Metoprolol to Sotalol 2  days prior to ablation. NPO status, not to take vitamins/minerals day of procedure. Confirmed date and arrival time. No further questions.    Julisa Luke RN

## 2023-09-01 ENCOUNTER — HOSPITAL ENCOUNTER (OUTPATIENT)
Dept: CARDIOLOGY | Facility: HOSPITAL | Age: 78
Discharge: HOME | End: 2023-09-01
Attending: NURSE PRACTITIONER | Admitting: NURSE PRACTITIONER
Payer: COMMERCIAL

## 2023-09-01 ENCOUNTER — ANESTHESIA (OUTPATIENT)
Dept: CARDIOLOGY | Facility: HOSPITAL | Age: 78
End: 2023-09-01
Payer: COMMERCIAL

## 2023-09-01 ENCOUNTER — ANESTHESIA EVENT (OUTPATIENT)
Dept: CARDIOLOGY | Facility: HOSPITAL | Age: 78
End: 2023-09-01
Payer: COMMERCIAL

## 2023-09-01 VITALS
DIASTOLIC BLOOD PRESSURE: 64 MMHG | RESPIRATION RATE: 28 BRPM | TEMPERATURE: 97.6 F | SYSTOLIC BLOOD PRESSURE: 102 MMHG | BODY MASS INDEX: 30.8 KG/M2 | HEART RATE: 53 BPM | HEIGHT: 74 IN | OXYGEN SATURATION: 100 % | WEIGHT: 240 LBS

## 2023-09-01 DIAGNOSIS — I48.19 PERSISTENT ATRIAL FIBRILLATION (H): ICD-10-CM

## 2023-09-01 LAB
ATRIAL RATE - MUSE: 47 BPM
DIASTOLIC BLOOD PRESSURE - MUSE: NORMAL MMHG
INTERPRETATION ECG - MUSE: NORMAL
P AXIS - MUSE: 39 DEGREES
PR INTERVAL - MUSE: 174 MS
QRS DURATION - MUSE: 92 MS
QT - MUSE: 462 MS
QTC - MUSE: 408 MS
R AXIS - MUSE: 40 DEGREES
SYSTOLIC BLOOD PRESSURE - MUSE: NORMAL MMHG
T AXIS - MUSE: 45 DEGREES
VENTRICULAR RATE- MUSE: 47 BPM

## 2023-09-01 PROCEDURE — 93010 ELECTROCARDIOGRAM REPORT: CPT | Performed by: INTERNAL MEDICINE

## 2023-09-01 PROCEDURE — 93005 ELECTROCARDIOGRAM TRACING: CPT

## 2023-09-01 PROCEDURE — 92960 CARDIOVERSION ELECTRIC EXT: CPT

## 2023-09-01 PROCEDURE — 250N000009 HC RX 250

## 2023-09-01 PROCEDURE — 92960 CARDIOVERSION ELECTRIC EXT: CPT | Performed by: NURSE PRACTITIONER

## 2023-09-01 PROCEDURE — 370N000017 HC ANESTHESIA TECHNICAL FEE, PER MIN

## 2023-09-01 PROCEDURE — 999N000054 HC STATISTIC EKG NON-CHARGEABLE

## 2023-09-01 RX ORDER — LIDOCAINE 40 MG/G
CREAM TOPICAL
Status: DISCONTINUED | OUTPATIENT
Start: 2023-09-01 | End: 2023-09-01 | Stop reason: HOSPADM

## 2023-09-01 RX ADMIN — METHOHEXITAL SODIUM 100 MG: 500 INJECTION, POWDER, LYOPHILIZED, FOR SOLUTION INTRAMUSCULAR; INTRAVENOUS; RECTAL at 09:51

## 2023-09-01 ASSESSMENT — ACTIVITIES OF DAILY LIVING (ADL)
ADLS_ACUITY_SCORE: 35
ADLS_ACUITY_SCORE: 35

## 2023-09-01 NOTE — ANESTHESIA POSTPROCEDURE EVALUATION
Patient: Daquan Schmitt    Procedure: * No procedures listed *  Cardioversion External    Anesthesia Type:  General    Note:  Disposition: Outpatient   Postop Pain Control: Uneventful            Sign Out: Well controlled pain   PONV: No   Neuro/Psych: Uneventful            Sign Out: Acceptable/Baseline neuro status   Airway/Respiratory: Uneventful            Sign Out: Acceptable/Baseline resp. status   CV/Hemodynamics: Uneventful            Sign Out: Acceptable CV status; No obvious hypovolemia; No obvious fluid overload   Other NRE: NONE   DID A NON-ROUTINE EVENT OCCUR? No       Last vitals:  Vitals:    09/01/23 0857 09/01/23 0956   BP: 134/84 105/70   Pulse: 74 (!) 43   Resp: 16 18   Temp: 36.4  C (97.6  F)    SpO2: 97% 97%       Electronically Signed By: Jeromy Elizabeth MD  September 1, 2023  10:06 AM

## 2023-09-01 NOTE — PROCEDURES
[facility]    Procedure: Cardioversion External    Date/Time: 9/1/2023 10:40 AM    Performed by: Rosalie Gilman APRN CNP  Authorized by: Zee Garcia APRN CNP      UNIVERSAL PROTOCOL   Site Marked: NA  Prior Images Obtained and Reviewed:  Yes  Required items: Required blood products, implants, devices and special equipment available    Patient identity confirmed:  Verbally with patient, arm band and provided demographic data  Patient was reevaluated immediately before administering moderate or deep sedation or anesthesia  Confirmation Checklist:  Patient's identity using two indicators, relevant allergies, procedure was appropriate and matched the consent or emergent situation and correct equipment/implants were available  Time out: Immediately prior to the procedure a time out was called    Universal Protocol: the Joint Commission Universal Protocol was followed       ANESTHESIA    Anesthesia was administered and monitored by anesthesiology.  See anesthesia documentation for details.    PROCEDURE DETAILS  Pre-procedure rhythm: atrial fibrillation  Patient position: patient was placed in a supine position  Chest area: chest area exposed  Electrodes: pads  Electrodes placed: anterior-posterior  Number of attempts: 2    Details of Attempts:  At 0953, after administration of IV Brevital by MDA and confirmation of adequate sedation, he received a synchronous shock of 200 J, but remained in AF.  He immediately received a second synchronous shock at 300 J with prompt restoration of junctional bradycardia in the 40s which subsequently stabilized to sinus bradycardia in the 50s to 60s.  Post cardioversion EKG pending.  Post-procedure rhythm: normal sinus rhythm  Complications: no complications      PROCEDURE  Describe Procedure: History of persistent atrial fibrillation diagnosed in early June 2023.  He appears to have symptoms of fatigue and decreased activity tolerance.  Echo shows preserved  LVEF, but moderate LAE.  He was started on sotalol 80 mg twice daily 2 days prior to cardioversion.  He has a [id number] score of 4 for age >75, hypertension, CAD.  He is on Eliquis for stroke prophylaxis and denies missing any doses in >3 weeks.  Successful cardioversion to sinus rhythm  Continue sotalol 80 mg twice daily  Continue Eliquis 5 mg twice daily for stroke prophylaxis  Follow up with Zee Garcia CNP on 10/6/2023  Discharge to home 1 hour after cardioversion as he is fully awake and stable.  Patient Tolerance:  Patient tolerated the procedure well with no immediate complications  Length of time physician/provider present for 1:1 monitoring during sedation: 10

## 2023-09-01 NOTE — ANESTHESIA PREPROCEDURE EVALUATION
Anesthesia Pre-Procedure Evaluation    Patient: Daquan Schmitt   MRN: [id number] : 1945        Procedure :   Cardioversion External       No past medical history on file.   No past surgical history on file.   Allergies   Allergen Reactions    Nickel Rash      Social History     Tobacco Use    Smoking status: Former     Years: .     Types: Cigarettes    Smokeless tobacco: Never   Substance Use Topics    Alcohol use: Not on file      Wt Readings from Last 1 Encounters:   08/15/23 113.8 kg (250 lb 12.8 oz)        Anesthesia Evaluation            ROS/MED HX  ENT/Pulmonary:  - neg pulmonary ROS   (+) sleep apnea,                                      Neurologic:  - neg neurologic ROS     Cardiovascular:  - neg cardiovascular ROS   (+)  hypertension- -  CAD -  - -                                      METS/Exercise Tolerance: >4 METS    Hematologic:  - neg hematologic  ROS     Musculoskeletal:  - neg musculoskeletal ROS     GI/Hepatic:  - neg GI/hepatic ROS     Renal/Genitourinary:  - neg Renal ROS     Endo:  - neg endo ROS     Psychiatric/Substance Use:  - neg psychiatric ROS     Infectious Disease:  - neg infectious disease ROS     Malignancy:  - neg malignancy ROS     Other:  - neg other ROS          Physical Exam    Airway        Mallampati: II    Neck ROM: full     Respiratory Devices and Support         Dental           Cardiovascular   cardiovascular exam normal          Pulmonary   pulmonary exam normal                OUTSIDE LABS:  CBC:   Lab Results   Component Value Date    WBC 10.0 2023    HGB 15.2 2023    HCT 43.8 2023     2023     BMP:   Lab Results   Component Value Date     2023    POTASSIUM 4.4 2023    CHLORIDE 102 2023    CO2 26 2023    BUN 16 2023    CR 1.00 2023     (H) 2023     COAGS:   Lab Results   Component Value Date    INR 0.94 2023     POC: No results found for: BGM, HCG, HCGS  HEPATIC: No  results found for: ALBUMIN, PROTTOTAL, ALT, AST, GGT, ALKPHOS, BILITOTAL, BILIDIRECT, ASIF  OTHER:   Lab Results   Component Value Date    ZAIDA 9.7 06/05/2023    MAG 2.1 06/05/2023    TSH 1.12 06/05/2023       Anesthesia Plan    ASA Status:  2       Anesthesia Type: General.     - Airway: Mask Only              Consents    Anesthesia Plan(s) and associated risks, benefits, and realistic alternatives discussed. Questions answered and patient/representative(s) expressed understanding.     - Discussed:     - Discussed with:  Patient            Postoperative Care            Comments:                El Bedolla MD

## 2023-09-01 NOTE — ANESTHESIA CARE TRANSFER NOTE
Patient: Daquan Schmitt    Procedure: * No procedures listed *  Cardioversion External    Diagnosis: * No pre-op diagnosis entered *  Diagnosis Additional Information: No value filed.    Anesthesia Type:   General     Note:    Oropharynx: oral airway in place and spontaneously breathing  Level of Consciousness: drowsy  Oxygen Supplementation: nasal cannula  Level of Supplemental Oxygen (L/min / FiO2): 5  Independent Airway: airway patency not satisfactory and stable  Dentition: dentition unchanged  Vital Signs Stable: post-procedure vital signs reviewed and stable  Report to RN Given: handoff report given  Patient transferred to: Cardiac Special Care          Vitals:  Vitals Value Taken Time   /70 09/01/23 0955   Temp     Pulse 56 09/01/23 0954   Resp 18 09/01/23 0954   SpO2 95 % 09/01/23 0954   Vitals shown include unvalidated device data.    Electronically Signed By: TELLY Sun CRNA  September 1, 2023  9:56 AM

## 2023-09-01 NOTE — DISCHARGE INSTRUCTIONS
Electrical Cardioversion: What to Expect at Home  Your recovery     Electrical cardioversion is a treatment for an abnormal heartbeat, such as atrial fibrillation, supraventricular tachycardia, or ventricular tachycardia (VT). Your doctor used a brief electrical shock to reset your heart's rhythm.  After the procedure, you may have redness, like a sunburn, where the patches were. The medicines you got to make you sleepy may make you feel drowsy for the rest of the day. You may feel soreness or discomfort in your chest wall for a few days.  Your doctor may have you take medicines to help the heart beat normally and to prevent blood clots.  This care sheet gives you a general idea about how long it will take for you to recover. But each person recovers at a different pace. Follow the steps below to feel better as quickly as possible.  How can you care for yourself at home?  Medicines    If the doctor gave you a sedative:  For 24 hours, don't do anything that requires attention to detail. It takes time for the medicine's effects to completely wear off.  For your safety, do not drive or operate any machinery that could be dangerous. Wait until the medicine wears off and you can think clearly and react easily.     Be safe with medicines. Take your medicines exactly as prescribed. Call your doctor if you think you are having a problem with your medicine. You may take one or more of the following medicines:  Rate-control medicines to slow the heart rate.  Rhythm-control medicines that help the heart keep a normal rhythm.  Blood thinners, also called anticoagulants, which help prevent blood clots.  You will get more details on the specific medicines your doctor prescribes. Be sure you know how to take your medicines safely.     Do not take any vitamins, over-the-counter medicines, or herbal products without talking to your doctor first.   Exercise    Talk to your doctor about what type and level of exercise are safe for  you.     When you exercise, watch for signs that your heart is working too hard. You are pushing too hard if you cannot talk while you are exercising. If you become short of breath or dizzy or have chest pain, sit down and rest right away.     Check your pulse regularly. Place two fingers on the artery at the palm side of your wrist in line with your thumb. If your heartbeat seems uneven or fast, talk to your doctor.   Heart-healthy lifestyle    Do not smoke. If you need help quitting, talk to your doctor about stop-smoking programs and medicines. These can increase your chances of quitting for good.     Eat heart-healthy foods. Limit sodium, alcohol, and sugar.     Stay at a healthy weight. Lose weight if you need to.     Manage other health problems. If you think you may have a problem with alcohol or drug use, talk to your doctor.   Follow-up care is a key part of your treatment and safety. Be sure to make and go to all appointments, and call your doctor if you are having problems. It's also a good idea to know your test results and keep a list of the medicines you take.  When should you call for help?   Call 911 anytime you think you may need emergency care. For example, call if:    You passed out (lost consciousness).     You have symptoms of a heart attack. These may include:  Chest pain or pressure, or a strange feeling in the chest.  Sweating.  Shortness of breath.  Nausea or vomiting.  Pain, pressure, or a strange feeling in the back, neck, jaw, or upper belly or in one or both shoulders or arms.  Lightheadedness or sudden weakness.  A fast or irregular heartbeat.     After calling 911, the  may tell you to chew 1 adult-strength or 2 to 4 low-dose aspirin. Wait for an ambulance. Do not try to drive yourself.     You have symptoms of a stroke. These may include:  Sudden numbness, tingling, weakness, or loss of movement in your face, arm, or leg, especially on only one side of your body.  Sudden  "vision changes.  Sudden trouble speaking.  Sudden confusion or trouble understanding simple statements.  Sudden problems with walking or balance.  A sudden, severe headache that is different from past headaches.   Call your doctor now or seek immediate medical care if:    You feel dizzy or lightheaded, or you feel like you may faint.     You have a fast or irregular heartbeat.   Watch closely for any changes in your health, and be sure to contact your doctor if you have any problems.  Where can you learn more?  Go to https://www.[employer].net/patiented  Enter A617 in the search box to learn more about \"Electrical Cardioversion: What to Expect at Home.\"  Current as of: September 7, 2022               Content Version: 13.7    3403-7962 Tailored.   Care instructions adapted under license by your healthcare professional. If you have questions about a medical condition or this instruction, always ask your healthcare professional. [employer] disclaims any warranty or liability for your use of this information.      "

## 2023-09-01 NOTE — INTERVAL H&P NOTE
I have reviewed the surgical (or preoperative) H&P that is linked to this encounter, and examined the patient. There are no significant changes.  Pt denies missing any doses of Eliquis in > 3 weeks.    Clinical Conditions Present on Arrival:  Clinically Significant Risk Factors Present on Admission                # Drug Induced Coagulation Defect: home medication list includes an anticoagulant medication

## 2023-09-01 NOTE — PROGRESS NOTES
Discharged to home following cardioversion. No change in medication at this time. Follow up with Zee in clinic in October.

## 2023-09-15 ENCOUNTER — NURSE TRIAGE (OUTPATIENT)
Dept: CARDIOLOGY | Facility: CLINIC | Age: 78
End: 2023-09-15
Payer: COMMERCIAL

## 2023-09-15 DIAGNOSIS — I48.19 PERSISTENT ATRIAL FIBRILLATION (H): Primary | ICD-10-CM

## 2023-09-15 RX ORDER — SOTALOL HYDROCHLORIDE 120 MG/1
60 TABLET ORAL 2 TIMES DAILY
Qty: 180 TABLET | Refills: 1 | Status: SHIPPED | OUTPATIENT
Start: 2023-09-15 | End: 2024-03-11

## 2023-09-15 NOTE — TELEPHONE ENCOUNTER
Return call to patient.  He states that his heart rates today are 40-41 after walking around to get his [employer] rigo to register his heart rates, he thinks they are in the high 30's at rest.    Pt states that  before he was diagnosed with afib his heart rates were in the low 50's.  He continues Sotalol 80 mg bid post cardioversion on 9-1-23.  Pt reports a little fatigue and a mild headache today.  He denies dizziness or lightheadeness.    Pt was last seen by Zee Garcia on 8-15-23.  Will review with Zee and call patient with recommendations.

## 2023-09-15 NOTE — TELEPHONE ENCOUNTER
"Patient spoke to Triage regarding low heart rates this AM per his Connect Mobile device reading around 40, 41. Blood pressure at 10:30am this morning was 110/65. Patient took his medications including lisinopril-hydrochlorothiazide 20-12.5mg and sotalol 80mg at 8am this morning. Patient says he had a Cardioversion done two weeks ago and heart rates have been in the 50's and now they are trending low. Patient does not report any major symptoms other than a little occasional headache and some fatigue. Patient last saw Zee Ngo. Routing to that nursing team for further review and follow-up with patient.     1. DESCRIPTION: \"Please describe your heart rate or heartbeat that you are having\" (e.g., fast/slow, regular/irregular, skipped or extra beats, \"palpitations\") Low heart rates in the 40's.  2. ONSET: \"When did it start?\" (Minutes, hours or days) This AM.  3. DURATION: \"How long does it last\" (e.g., seconds, minutes, hours)   4. PATTERN \"Does it come and go, or has it been constant since it started?\" \"Does it get worse with exertion?\" \"Are you feeling it now?\" Activity raises the heart rate, current low. Normally was in the 50's.  5. TAP: \"Using your hand, can you tap out what you are feeling on a chair or table in front of you, so that I can hear?\" (Note: not all patients can do this)  6. HEART RATE: \"Can you tell me your heart rate?\" \"How many beats in 15 seconds?\" (Note: not all patients can do this) 40, 41 per [employer].  7. RECURRENT SYMPTOM: \"Have you ever had this before?\" If Yes, ask: \"When was the last time?\" and \"What happened that time?\" This occurrence. Cardioversion was done two weeks ago.  8. CAUSE: \"What do you think is causing the low heart rate?\" Cardiac cause.  9. CARDIAC HISTORY: \"Do you have any history of heart disease?\" (e.g., heart attack, angina, bypass surgery, angioplasty, arrhythmia) A-Fib.  10. OTHER SYMPTOMS: \"Do you have any other symptoms?\" (e.g., dizziness, chest pain, sweating, " difficulty breathing) None other than an occasional headache, some fatigue. No dizziness.    Additional Information    Negative: Sounds like a life-threatening emergency to the triager    Protocols used: Heart Rate and Heartbeat [id number]

## 2023-09-15 NOTE — TELEPHONE ENCOUNTER
Zee Garcia APRN CNP  [facility] Ep Support Pool - Lhe11 minutes ago (12:44 PM)     EM  Recommend decreasing sotalol to 60mg twice a day. Thank you

## 2023-09-15 NOTE — TELEPHONE ENCOUNTER
Return call to patient, reviewed instructions from Zee noted below.  He states understanding, new rx sent to patients pharmacy.

## 2023-10-05 NOTE — PROGRESS NOTES
[facility]  Cardiac Electrophysiology  [address]  [place] [address]   Office: [phone]  Fax: [phone]     HEART CARE ELECTROPHYSIOLOGY FOLLOW UP    Primary Care: Carmen Stout MD    Assessment/Recommendations     Persistent atrial fibrillation: Incidental diagnosis after noting elevated heart rates by blood pressure cuff 6/5/2023.  Status post DCCV 9/1/2023 with prior sotalol initiation, subsequently decreased due to sinus bradycardia.  Symptomatic improvement in energy level following cardioversion though he continues to feel fatigued likely secondary to sotalol.  Discussed the pathophysiology and natural progression of atrial fibrillation, management options including ventricular rate control, antiarrhythmic drug therapy and consideration of catheter ablation.  Antiarrhythmic drug options are limited due to his underlying sinus bradycardia.  We will plan for MPI for class Ic candidacy and plan to discontinue sotalol.  We briefly discussed catheter ablation, including indications, risks and benefits and he would like to consider further.  -MPI treadmill  -Continue sotalol 60 mg twice daily for now  -Pending MPI results, plan to discontinue sotalol initiate flecainide 50 mg twice a day and metoprolol tartrate 12.5-25 mg twice a day    [id number] score of 4 for age >75, hypertension, CAD  -Continue Eliquis 5 mg twice a day for stroke prophylaxis     Essential hypertension: Well managed on current regimen     Obstructive sleep apnea: Consistent use of CPAP    Sinus node dysfunction: Resting heart rate 50s previous to beta-blocker therapy.  No concurrent symptoms. Continue expectant management    Follow up: with myself 6-8 weeks     History of Present Illness/Subjective    Daquan GIULIANA Gregoryana mti is a 78 year old male with past medical history significant for essential hypertension, CAD, hyperlipidemia, prediabetes, MARY, lumbar radiculopathy. He is seen today for  status post DCCV 9/1/2023 with prior initiation of sotalol 80 mg twice a day.  His sotalol was decreased to 60 mg twice a day due to sinus bradycardia 9/15/2023.  He notes improved energy level following cardioversion though continues to feel fatigued, sleeping more and napping.  He uses [employer] mobile device regularly which notes no atrial fibrillation, average heart rates in the 40s in sinus rhythm, 80s in atrial fibrillation. He denies lightheadedness/dizziness, changes in activity tolerance or syncope.  He is vacationing in [place] next week.       Data Review     Arrhythmia hx:   Dx/date: AF RVR diagnosed 6/5/2023 after noting elevated heart rate when checking blood pressure.  Started on Eliquis and metoprolol. LVEF 55-60% with mild RV systolic dysfunction and moderate LAE by TTE, with persistent AF with largely controlled ventricular response on 24-hour Holter monitoring 6/20/2023.  DCCV done 9/1/2023 with prior sotalol initiation.  Sx: fatigue  Prior AAD, AV gudelia blocking agents: Metoprolol, sotalol (8/30/2023-present)  Procedures  DCCV: N/A  Ablation: N/A     EKG:   10/6/2023: Kardia mobile tracings reviewed.  Persistent AF 80s August 2023.  Sinus rhythm 40-50s following DCCV  6/5/2023: AF  bpm, QRS 96 ms, QT/QTc 344/452 ms  Personally reviewed.      TTE: 7/25/2023  The left ventricle is normal in size.  Left ventricular function is normal.The ejection fraction is 55-60%.  The right ventricle is mildly dilated.  Mildly decreased right ventricular systolic function  The left atrium is moderately dilated.  There is mild (1+) mitral regurgitation.  Sclerosis of the aortic valve. Aortic valve not well visualized.     MCOT, CHRISTOPH:      24-hour Holter monitoring done 6/20/2023.  Continuous AF average 91 bpm, range  bpm.  Rare ventricular ectopy.  No symptom triggers    I have reviewed and updated the patient's past medical history, allergy list and medication list.          Physical Examination  "  Vitals: /56 (BP Location: Left arm, Patient Position: Sitting, Cuff Size: Adult Large)   Pulse (!) 42   Ht 1.88 m (6' 2\")   Wt 113 kg (249 lb 1.6 oz)   SpO2 97%   BMI 31.98 kg/m      BMI= Body mass index is 31.98 kg/m .    Wt Readings from Last 3 Encounters:   10/06/23 113 kg (249 lb 1.6 oz)   09/01/23 108.9 kg (240 lb)   08/15/23 113.8 kg (250 lb 12.8 oz)       General   Appearance:   Alert and oriented, in no acute distress.    HEENT:  Normocephalic and atraumatic. Conjunctiva and sclera are clear. Moist oral mucosa.    Neck: No JVP, carotid bruit or obvious thyromegaly.   Lungs:   Respirations unlabored. Clear bilaterally with no rales, rhonchi, or wheezes.     Cardiovascular:   Rhythm is regular. S1 and S2 are normal. No significant murmur is present. Lower extremities demonstrate no significant edema. Posterior tibial pulses are intact bilaterally.   Extremities: No cyanosis or clubbing   Skin: Skin is warm, dry, and otherwise intact.   Neurologic: Gait not assessed. Mood and affect appropriate.           Medical History  Surgical History Family History Social History   No past medical history on file. No past surgical history on file. Family History   Problem Relation Age of Onset    Myocardial Infarction Mother 54    Aortic aneurysm Father 70    Aortic aneurysm Brother 63    Social History     Socioeconomic History    Marital status:      Spouse name: Not on file    Number of children: Not on file    Years of education: Not on file    Highest education level: Not on file   Occupational History    Not on file   Tobacco Use    Smoking status: Former     Years: 20.00     Types: Cigarettes    Smokeless tobacco: Never   Vaping Use    Vaping Use: Never used   Substance and Sexual Activity    Alcohol use: Not on file    Drug use: Never    Sexual activity: Not on file   Other Topics Concern    Not on file   Social History Narrative    Not on file     Social Determinants of Health     Financial " Resource Strain: Not on file   Food Insecurity: Not on file   Transportation Needs: Not on file   Physical Activity: Not on file   Stress: Not on file   Social Connections: Not on file   Interpersonal Safety: Not on file   Housing Stability: Not on file          Medications  Allergies   Scheduled Meds:  Current Outpatient Medications   Medication Sig Dispense Refill    apixaban ANTICOAGULANT (ELIQUIS ANTICOAGULANT) 5 MG tablet Take 1 tablet (5 mg) by mouth 2 times daily 60 tablet 3    ascorbic acid (VITAMIN C) 500 MG CPCR CR capsule Take 500 mg by mouth daily      cetirizine (ZYRTEC) 10 MG tablet Take 10 mg by mouth daily      co-enzyme Q-10 100 MG CAPS capsule Take 100 mg by mouth daily      finasteride (PROSCAR) 5 MG tablet Take 1 tablet (5 mg) by mouth daily 30 tablet 1    lisinopril-hydrochlorothiazide (ZESTORETIC) 20-12.5 MG tablet Take 2 tablets by mouth daily      melatonin 5 MG CAPS Take 5 mg by mouth nightly as needed      pravastatin (PRAVACHOL) 40 MG tablet Take 40 mg by mouth daily      sildenafil (REVATIO) 20 MG tablet Take 1 tablet by mouth daily as needed (only as needed not every day)      sotalol (BETAPACE) 120 MG tablet Take 0.5 tablets (60 mg) by mouth 2 times daily 180 tablet 1    Allergies   Allergen Reactions    Nickel Rash         Lab Results    Chemistry/lipid CBC Cardiac Enzymes/BNP/TSH/INR   Lab Results   Component Value Date    BUN 16 06/05/2023     06/05/2023    CO2 26 06/05/2023    Lab Results   Component Value Date    WBC 10.0 06/05/2023    HGB 15.2 06/05/2023    HCT 43.8 06/05/2023    MCV 91 06/05/2023     06/05/2023    @RESUFAST(BMP,CBC,BNP,TSH,  INR)@      30 minutes spent reviewing prior records (including documentation, laboratory studies, cardiac testing/imaging), history and physical exam, planning, and subsequent documentation.     This note has been dictated using voice recognition software. Any grammatical, typographical, or context distortions are unintentional  and inherent to the software.    Zee Garcia, CNP  Clinical Cardiac Electrophysiology  [facility]  Clinic and [phone]  Fax: [phone]  Electrophysiology Nurses: [phone]

## 2023-10-06 ENCOUNTER — OFFICE VISIT (OUTPATIENT)
Dept: CARDIOLOGY | Facility: CLINIC | Age: 78
End: 2023-10-06
Payer: COMMERCIAL

## 2023-10-06 VITALS
OXYGEN SATURATION: 97 % | BODY MASS INDEX: 31.97 KG/M2 | HEIGHT: 74 IN | DIASTOLIC BLOOD PRESSURE: 56 MMHG | SYSTOLIC BLOOD PRESSURE: 130 MMHG | WEIGHT: 249.1 LBS | HEART RATE: 42 BPM

## 2023-10-06 DIAGNOSIS — I25.10 NONOBSTRUCTIVE ATHEROSCLEROSIS OF CORONARY ARTERY: ICD-10-CM

## 2023-10-06 DIAGNOSIS — G47.33 OSA (OBSTRUCTIVE SLEEP APNEA): ICD-10-CM

## 2023-10-06 DIAGNOSIS — I48.19 PERSISTENT ATRIAL FIBRILLATION (H): Primary | ICD-10-CM

## 2023-10-06 DIAGNOSIS — I49.5 SINUS NODE DYSFUNCTION (H): ICD-10-CM

## 2023-10-06 PROCEDURE — 99214 OFFICE O/P EST MOD 30 MIN: CPT | Performed by: NURSE PRACTITIONER

## 2023-10-06 NOTE — PATIENT INSTRUCTIONS
Daquan Schmitt,    It was a pleasure to see you today at the [facility].     My recommendations after this visit include:  -Continue current medications  -Schedule stress testing  -Follow up with me in 6 weeks    Please do not hesitate to call with additional questions or concerns.     Zee Garcia, CNP  Clinical Cardiac Electrophysiology  [facility]  Clinic and [phone]  Fax: [phone]  Electrophysiology Nurses: [phone]

## 2023-10-06 NOTE — LETTER
10/6/2023    Carmen Stout, TELLY CNP  [address] [facility] [address]    RE: Daquan Schmitt       Dear Colleague,     I had the pleasure of seeing [facility] in the [facility].     [facility]  Cardiac Electrophysiology  [address]  [place] [address]   Office: [phone]  Fax: [phone]     HEART CARE ELECTROPHYSIOLOGY FOLLOW UP    Primary Care: Carmen Stout MD    Assessment/Recommendations     Persistent atrial fibrillation: Incidental diagnosis after noting elevated heart rates by blood pressure cuff 6/5/2023.  Status post DCCV 9/1/2023 with prior sotalol initiation, subsequently decreased due to sinus bradycardia.  Symptomatic improvement in energy level following cardioversion though he continues to feel fatigued likely secondary to sotalol.  Discussed the pathophysiology and natural progression of atrial fibrillation, management options including ventricular rate control, antiarrhythmic drug therapy and consideration of catheter ablation.  Antiarrhythmic drug options are limited due to his underlying sinus bradycardia.  We will plan for MPI for class Ic candidacy and plan to discontinue sotalol.  We briefly discussed catheter ablation, including indications, risks and benefits and he would like to consider further.  -MPI treadmill  -Continue sotalol 60 mg twice daily for now  -Pending MPI results, plan to discontinue sotalol initiate flecainide 50 mg twice a day and metoprolol tartrate 12.5-25 mg twice a day    [id number] score of 4 for age >75, hypertension, CAD  -Continue Eliquis 5 mg twice a day for stroke prophylaxis     Essential hypertension: Well managed on current regimen     Obstructive sleep apnea: Consistent use of CPAP    Sinus node dysfunction: Resting heart rate 50s previous to beta-blocker therapy.  No concurrent symptoms. Continue expectant management    Follow up: with myself 6-8 weeks     History of  Present Illness/Subjective    Daquan Schmitt is a 78 year old male with past medical history significant for essential hypertension, CAD, hyperlipidemia, prediabetes, MARY, lumbar radiculopathy. He is seen today for status post DCCV 9/1/2023 with prior initiation of sotalol 80 mg twice a day.  His sotalol was decreased to 60 mg twice a day due to sinus bradycardia 9/15/2023.  He notes improved energy level following cardioversion though continues to feel fatigued, sleeping more and napping.  He uses [employer] mobile device regularly which notes no atrial fibrillation, average heart rates in the 40s in sinus rhythm, 80s in atrial fibrillation. He denies lightheadedness/dizziness, changes in activity tolerance or syncope.  He is vacationing in [place] next week.       Data Review     Arrhythmia hx:   Dx/date: AF RVR diagnosed 6/5/2023 after noting elevated heart rate when checking blood pressure.  Started on Eliquis and metoprolol. LVEF 55-60% with mild RV systolic dysfunction and moderate LAE by TTE, with persistent AF with largely controlled ventricular response on 24-hour Holter monitoring 6/20/2023.  DCCV done 9/1/2023 with prior sotalol initiation.  Sx: fatigue  Prior AAD, AV gudelia blocking agents: Metoprolol, sotalol (8/30/2023-present)  Procedures  DCCV: N/A  Ablation: N/A     EKG:   10/6/2023: [employer] mobile tracings reviewed.  Persistent AF 80s August 2023.  Sinus rhythm 40-50s following DCCV  6/5/2023: AF  bpm, QRS 96 ms, QT/QTc 344/452 ms  Personally reviewed.      TTE: 7/25/2023  The left ventricle is normal in size.  Left ventricular function is normal.The ejection fraction is 55-60%.  The right ventricle is mildly dilated.  Mildly decreased right ventricular systolic function  The left atrium is moderately dilated.  There is mild (1+) mitral regurgitation.  Sclerosis of the aortic valve. Aortic valve not well visualized.     MCOT, CHRISTOPH:      24-hour Holter monitoring done 6/20/2023.  Continuous AF  "average 91 bpm, range  bpm.  Rare ventricular ectopy.  No symptom triggers    I have reviewed and updated the patient's past medical history, allergy list and medication list.          Physical Examination   Vitals: /56 (BP Location: Left arm, Patient Position: Sitting, Cuff Size: Adult Large)   Pulse (!) 42   Ht 1.88 m (6' 2\")   Wt 113 kg (249 lb 1.6 oz)   SpO2 97%   BMI 31.98 kg/m      BMI= Body mass index is 31.98 kg/m .    Wt Readings from Last 3 Encounters:   10/06/23 113 kg (249 lb 1.6 oz)   09/01/23 108.9 kg (240 lb)   08/15/23 113.8 kg (250 lb 12.8 oz)       General   Appearance:   Alert and oriented, in no acute distress.    HEENT:  Normocephalic and atraumatic. Conjunctiva and sclera are clear. Moist oral mucosa.    Neck: No JVP, carotid bruit or obvious thyromegaly.   Lungs:   Respirations unlabored. Clear bilaterally with no rales, rhonchi, or wheezes.     Cardiovascular:   Rhythm is regular. S1 and S2 are normal. No significant murmur is present. Lower extremities demonstrate no significant edema. Posterior tibial pulses are intact bilaterally.   Extremities: No cyanosis or clubbing   Skin: Skin is warm, dry, and otherwise intact.   Neurologic: Gait not assessed. Mood and affect appropriate.           Medical History  Surgical History Family History Social History   No past medical history on file. No past surgical history on file. Family History   Problem Relation Age of Onset    Myocardial Infarction Mother 54    Aortic aneurysm Father 70    Aortic aneurysm Brother 63    Social History     Socioeconomic History    Marital status:      Spouse name: Not on file    Number of children: Not on file    Years of education: Not on file    Highest education level: Not on file   Occupational History    Not on file   Tobacco Use    Smoking status: Former     Years: 20.00     Types: Cigarettes    Smokeless tobacco: Never   Vaping Use    Vaping Use: Never used   Substance and Sexual " Activity    Alcohol use: Not on file    Drug use: Never    Sexual activity: Not on file   Other Topics Concern    Not on file   Social History Narrative    Not on file     Social Determinants of Health     Financial Resource Strain: Not on file   Food Insecurity: Not on file   Transportation Needs: Not on file   Physical Activity: Not on file   Stress: Not on file   Social Connections: Not on file   Interpersonal Safety: Not on file   Housing Stability: Not on file          Medications  Allergies   Scheduled Meds:  Current Outpatient Medications   Medication Sig Dispense Refill    apixaban ANTICOAGULANT (ELIQUIS ANTICOAGULANT) 5 MG tablet Take 1 tablet (5 mg) by mouth 2 times daily 60 tablet 3    ascorbic acid (VITAMIN C) 500 MG CPCR CR capsule Take 500 mg by mouth daily      cetirizine (ZYRTEC) 10 MG tablet Take 10 mg by mouth daily      co-enzyme Q-10 100 MG CAPS capsule Take 100 mg by mouth daily      finasteride (PROSCAR) 5 MG tablet Take 1 tablet (5 mg) by mouth daily 30 tablet 1    lisinopril-hydrochlorothiazide (ZESTORETIC) 20-12.5 MG tablet Take 2 tablets by mouth daily      melatonin 5 MG CAPS Take 5 mg by mouth nightly as needed      pravastatin (PRAVACHOL) 40 MG tablet Take 40 mg by mouth daily      sildenafil (REVATIO) 20 MG tablet Take 1 tablet by mouth daily as needed (only as needed not every day)      sotalol (BETAPACE) 120 MG tablet Take 0.5 tablets (60 mg) by mouth 2 times daily 180 tablet 1    Allergies   Allergen Reactions    Nickel Rash         Lab Results    Chemistry/lipid CBC Cardiac Enzymes/BNP/TSH/INR   Lab Results   Component Value Date    BUN 16 06/05/2023     06/05/2023    CO2 26 06/05/2023    Lab Results   Component Value Date    WBC 10.0 06/05/2023    HGB 15.2 06/05/2023    HCT 43.8 06/05/2023    MCV 91 06/05/2023     06/05/2023    @RESUFAST(BMP,CBC,BNP,TSH,  INR)@      30 minutes spent reviewing prior records (including documentation, laboratory studies, cardiac  testing/imaging), history and physical exam, planning, and subsequent documentation.     This note has been dictated using voice recognition software. Any grammatical, typographical, or context distortions are unintentional and inherent to the software.    Zee Garcia CNP  Clinical Cardiac Electrophysiology  [facility]  Clinic and [phone]  Fax: [phone]  Electrophysiology Nurses: [phone]          Thank you for allowing me to participate in the care of your patient.      Sincerely,     TELLY HIDALGO CNP      Heart Care  cc:   No referring provider defined for this encounter.

## 2023-10-07 ENCOUNTER — HEALTH MAINTENANCE LETTER (OUTPATIENT)
Age: 78
End: 2023-10-07

## 2023-10-24 ENCOUNTER — TELEPHONE (OUTPATIENT)
Dept: CARDIOLOGY | Facility: CLINIC | Age: 78
End: 2023-10-24
Payer: COMMERCIAL

## 2023-10-24 NOTE — TELEPHONE ENCOUNTER
Okay to hold Eliquis 2 days prior to colonoscopy, no bridging.  Resume as soon as possible after at the discretion of the performing physician.  Thank you

## 2023-10-24 NOTE — TELEPHONE ENCOUNTER
Request for Anticoagulation Interruption    Procedure: Colonoscopy  Requested hold time from facility: 2 days prior  Date of procedure:  1121  Anticoagulation medication: Eliquis  [id number] score: 4  CrCl, mL/min: 97  Previous Procedures: Pt has not had hx of recent PVI in the past 3mo, does not have anticipated PVI within the next 1 mo, DCCV in the past 4 wks, and/or LAAO- restricting interruption in AC  Guidelines: Low Risk (Eliquis, Xarelto) with CrCl ml/min: > or = to 30 discontinue > or = to  24hrs, 15-29 discontinue > or = to 36hrs, <15 No Data consider anti Xa level an/or > or = to 48 hrs. Uncertain/Intermediate/High Risk (Eliquis, Xarelto) with CrCl ml/min: > or = to 30 discontinue > or = to 48 hrs, < 30 No data consider anti Xa level and/or > or = to 72 hrs    lease advise hold orders, with or without need for bridging  Thanks you,  10/24/2023 10:50 AM  Phyllis Montelongo RN     CrCl Calculator Cockcroft-Gault Equation- [employer]romed[employer]    2017 ACC Expert ConsensusDecision Pathway for PeriproceduralManagement of Anticoagulation inPatients With Nonvalvular Atrial Fibrillation    male  78 year old  Wt Readings from Last 1 Encounters:   10/06/23 113 kg (249 lb 1.6 oz)     Most Recent 3 BMP's:  Recent Labs   Lab Test 06/05/23  2338      POTASSIUM 4.4   CHLORIDE 102   CO2 26   BUN 16   CR 1.00   ANIONGAP 11   ZAIDA 9.7   *

## 2023-10-24 NOTE — TELEPHONE ENCOUNTER
[facility] Call Center    Phone Message    May a detailed message be left on voicemail: yes     Reason for Call: Other: Asking for 2 day hold on Eliquis prior to Colonoscopy on 11/21. If unable to do 2 day hold requesting creatinine level from the past 90 days. Please call with orders     Action Taken: Other: cardiology    Travel Screening: Not Applicable  Thank you!  [facility]

## 2023-10-24 NOTE — TELEPHONE ENCOUNTER
Noted.  Phone call to [facility] and messsage left on the dedicated anticoagulation hold line, contact information left for any additional concerns.

## 2023-10-29 DIAGNOSIS — I48.91 ATRIAL FIBRILLATION WITH RVR (H): ICD-10-CM

## 2023-10-30 RX ORDER — APIXABAN 5 MG/1
5 TABLET, FILM COATED ORAL 2 TIMES DAILY
Qty: 180 TABLET | Refills: 3 | Status: SHIPPED | OUTPATIENT
Start: 2023-10-30 | End: 2024-01-30

## 2024-01-12 ENCOUNTER — TELEPHONE (OUTPATIENT)
Dept: CARDIOLOGY | Facility: CLINIC | Age: 79
End: 2024-01-12
Payer: COMMERCIAL

## 2024-01-16 ENCOUNTER — HOSPITAL ENCOUNTER (OUTPATIENT)
Dept: NUCLEAR MEDICINE | Facility: CLINIC | Age: 79
Discharge: HOME | End: 2024-01-16
Attending: NURSE PRACTITIONER
Payer: COMMERCIAL

## 2024-01-16 ENCOUNTER — HOSPITAL ENCOUNTER (OUTPATIENT)
Dept: CARDIOLOGY | Facility: CLINIC | Age: 79
Discharge: HOME | End: 2024-01-16
Attending: NURSE PRACTITIONER
Payer: COMMERCIAL

## 2024-01-16 DIAGNOSIS — I25.10 NONOBSTRUCTIVE ATHEROSCLEROSIS OF CORONARY ARTERY: ICD-10-CM

## 2024-01-16 DIAGNOSIS — I48.19 PERSISTENT ATRIAL FIBRILLATION (H): ICD-10-CM

## 2024-01-16 LAB
CV STRESS CURRENT BP HE: NORMAL
CV STRESS CURRENT HR HE: 100
CV STRESS CURRENT HR HE: 102
CV STRESS CURRENT HR HE: 102
CV STRESS CURRENT HR HE: 103
CV STRESS CURRENT HR HE: 110
CV STRESS CURRENT HR HE: 110
CV STRESS CURRENT HR HE: 114
CV STRESS CURRENT HR HE: 122
CV STRESS CURRENT HR HE: 128
CV STRESS CURRENT HR HE: 129
CV STRESS CURRENT HR HE: 129
CV STRESS CURRENT HR HE: 130
CV STRESS CURRENT HR HE: 132
CV STRESS CURRENT HR HE: 133
CV STRESS CURRENT HR HE: 51
CV STRESS CURRENT HR HE: 53
CV STRESS CURRENT HR HE: 58
CV STRESS CURRENT HR HE: 59
CV STRESS CURRENT HR HE: 59
CV STRESS CURRENT HR HE: 65
CV STRESS CURRENT HR HE: 66
CV STRESS CURRENT HR HE: 66
CV STRESS CURRENT HR HE: 67
CV STRESS CURRENT HR HE: 68
CV STRESS CURRENT HR HE: 68
CV STRESS CURRENT HR HE: 70
CV STRESS CURRENT HR HE: 70
CV STRESS CURRENT HR HE: 86
CV STRESS CURRENT HR HE: 90
CV STRESS CURRENT HR HE: 97
CV STRESS DEVIATION TIME HE: NORMAL
CV STRESS ECHO PERCENT HR HE: NORMAL
CV STRESS EXERCISE STAGE HE: NORMAL
CV STRESS EXERCISE STAGE REACHED HE: NORMAL
CV STRESS FINAL RESTING BP HE: NORMAL
CV STRESS FINAL RESTING HR HE: 58
CV STRESS MAX HR HE: 134
CV STRESS MAX TREADMILL GRADE HE: 0
CV STRESS MAX TREADMILL SPEED HE: 0
CV STRESS PEAK DIA BP HE: NORMAL
CV STRESS PEAK SYS BP HE: NORMAL
CV STRESS PHASE HE: NORMAL
CV STRESS PROTOCOL HE: NORMAL
CV STRESS RESTING PT POSITION HE: NORMAL
CV STRESS RESTING PT POSITION HE: NORMAL
CV STRESS ST DEVIATION AMOUNT HE: NORMAL
CV STRESS ST DEVIATION ELEVATION HE: NORMAL
CV STRESS ST EVELATION AMOUNT HE: NORMAL
CV STRESS TEST TYPE HE: NORMAL
CV STRESS TOTAL STAGE TIME MIN 1 HE: NORMAL
NUC STRESS EJECTION FRACTION: 50 %
RATE PRESSURE PRODUCT: NORMAL
STRESS ECHO BASELINE DIASTOLIC HE: 78
STRESS ECHO BASELINE HR: 50
STRESS ECHO BASELINE SYSTOLIC BP: 182
STRESS ECHO CALCULATED PERCENT HR: 94 %
STRESS ECHO LAST STRESS DIASTOLIC BP: 74
STRESS ECHO LAST STRESS HR: 122
STRESS ECHO LAST STRESS SYSTOLIC BP: 174
STRESS ECHO POST ESTIMATED WORKLOAD: 7.1
STRESS ECHO POST EXERCISE DUR MIN: 7
STRESS ECHO POST EXERCISE DUR SEC: 11
STRESS ECHO TARGET HR: 142
STRESS ST DEPRESSION: 2 MM

## 2024-01-16 PROCEDURE — A9500 TC99M SESTAMIBI: HCPCS | Performed by: NURSE PRACTITIONER

## 2024-01-16 PROCEDURE — 78452 HT MUSCLE IMAGE SPECT MULT: CPT

## 2024-01-16 PROCEDURE — 78452 HT MUSCLE IMAGE SPECT MULT: CPT | Mod: 26 | Performed by: INTERNAL MEDICINE

## 2024-01-16 PROCEDURE — 93017 CV STRESS TEST TRACING ONLY: CPT

## 2024-01-16 PROCEDURE — 343N000001 HC RX 343: Performed by: NURSE PRACTITIONER

## 2024-01-16 PROCEDURE — 93016 CV STRESS TEST SUPVJ ONLY: CPT | Performed by: INTERNAL MEDICINE

## 2024-01-16 PROCEDURE — 93018 CV STRESS TEST I&R ONLY: CPT | Performed by: INTERNAL MEDICINE

## 2024-01-16 RX ADMIN — Medication 8.5 MILLICURIE: at 12:25

## 2024-01-16 RX ADMIN — Medication 34 MILLICURIE: at 13:40

## 2024-01-16 NOTE — PROGRESS NOTES
During second stage of [place] protocol pt having increase ventricular ectopy vs ST with aberrant conduction.  Dr Rivera notified.  Test was ended.  Pt was asymptomatic.  Pt was instruction to take it easy and not to exert himself.  Pt agreeable to the plan.  Pt instructed to contact Dr office for results by Thursday.  Laura Aviles RN

## 2024-01-16 NOTE — RESULT ENCOUNTER NOTE
Please let pt know his stress test was mildly abnormal with inducible nontransmural inferolateral infarct and ST changes with exercise. He should see a general cardiologist in the next few months for further evaluation. Unfortunately cannot use class 1c AAD so should stay on sotalol for now, continue to consider ablation. Thank you

## 2024-01-16 NOTE — PROGRESS NOTES
Pt had new A-Fib with DCCV in Sept.  Hx of calcium score of 9 in the LAD.  Here for further evaluation.  Pt denies shortness of breath or chest pain since DCCV.  Laura Aviles RN

## 2024-01-17 DIAGNOSIS — I49.5 SINUS NODE DYSFUNCTION (H): ICD-10-CM

## 2024-01-17 DIAGNOSIS — I25.10 NONOBSTRUCTIVE ATHEROSCLEROSIS OF CORONARY ARTERY: Primary | ICD-10-CM

## 2024-01-17 DIAGNOSIS — I10 PRIMARY HYPERTENSION: ICD-10-CM

## 2024-01-30 DIAGNOSIS — I48.91 ATRIAL FIBRILLATION WITH RVR (H): ICD-10-CM

## 2024-01-30 RX ORDER — APIXABAN 5 MG/1
5 TABLET, FILM COATED ORAL 2 TIMES DAILY
Qty: 180 TABLET | Refills: 3 | Status: SHIPPED | OUTPATIENT
Start: 2024-01-30

## 2024-02-02 ENCOUNTER — TELEPHONE (OUTPATIENT)
Dept: CARDIOLOGY | Facility: CLINIC | Age: 79
End: 2024-02-02
Payer: COMMERCIAL

## 2024-02-02 NOTE — TELEPHONE ENCOUNTER
From: Rosalie Gilman, APRN CNP    Reviewed in Zee's absence, okay to hold Eliquis for 2 days prior to procedure, no bridging recommended, restart ASAP afterwards  Thanks,  Rosalie      Returned call to [facility] [phone]  and spoke with SHAYNA Baker and gave instructions. Read back correctly.    Julisa Luke RN

## 2024-02-02 NOTE — TELEPHONE ENCOUNTER
Request for Anticoagulation Interruption    Procedure: Colonoscopy  Requested hold time from facility: 2 days prior  Date of procedure:  2/29/24  Anticoagulation medication: Eliquis  [id number] score: 4  CrCl, mL/min: 97.43  Previous Procedures: Pt has not had hx of recent PVI in the past 3mo, does not have anticipated PVI within the next 1 mo, DCCV in the past 4 wks, and/or LAAO- restricting interruption in AC  Guidelines: Low Risk (Eliquis, Xarelto) with CrCl ml/min: > or = to 30 discontinue > or = to  24hrs, 15-29 discontinue > or = to 36hrs, <15 No Data consider anti Xa level an/or > or = to 48 hrs. Uncertain/Intermediate/High Risk (Eliquis, Xarelto) with CrCl ml/min: > or = to 30 discontinue > or = to 48 hrs, < 30 No data consider anti Xa level and/or > or = to 72 hrs    lease advise hold orders, with or without need for bridging  Thanks you,  2/2/2024 2:20 PM  Holli Hernandez RN     CrCl Calculator Cockcroft-Gault Equation- [employer]    2017 ACC Expert ConsensusDecision Pathway for PeriproceduralManagement of Anticoagulation inPatients With Nonvalvular Atrial Fibrillation    male  78 year old  Wt Readings from Last 1 Encounters:   10/06/23 113 kg (249 lb 1.6 oz)     Most Recent 3 BMP's:  Recent Labs   Lab Test 06/05/23  2338      POTASSIUM 4.4   CHLORIDE 102   CO2 26   BUN 16   CR 1.00   ANIONGAP 11   ZAIDA 9.7   *

## 2024-02-02 NOTE — TELEPHONE ENCOUNTER
[facility] Call Center    Phone Message    May a detailed message be left on voicemail: yes     Reason for Call: Medication Question or concern regarding medication   Prescription Clarification  Name of Medication: apixaban ANTICOAGULANT (ELIQUIS ANTICOAGULANT) 5 MG tablet   Prescribing Provider:    Pharmacy:    What on the order needs clarification? Requesting two day hold prior to colonoscopy on 2/29, if unable to do full two day requesting creatinine level for past 90 days.      Action Taken: Other: Cardiology    Travel Screening: Not Applicable    Thank you!  [facility]

## 2024-03-11 ENCOUNTER — OFFICE VISIT (OUTPATIENT)
Dept: CARDIOLOGY | Facility: CLINIC | Age: 79
End: 2024-03-11
Payer: COMMERCIAL

## 2024-03-11 VITALS
BODY MASS INDEX: 32.1 KG/M2 | SYSTOLIC BLOOD PRESSURE: 144 MMHG | RESPIRATION RATE: 14 BRPM | HEART RATE: 52 BPM | DIASTOLIC BLOOD PRESSURE: 80 MMHG | WEIGHT: 250 LBS

## 2024-03-11 DIAGNOSIS — I49.5 SINUS NODE DYSFUNCTION (H): ICD-10-CM

## 2024-03-11 DIAGNOSIS — I25.10 CORONARY ARTERY CALCIFICATION SEEN ON CT SCAN: ICD-10-CM

## 2024-03-11 DIAGNOSIS — R94.39 ABNORMAL CARDIOVASCULAR STRESS TEST: Primary | ICD-10-CM

## 2024-03-11 DIAGNOSIS — E78.5 HYPERLIPIDEMIA, UNSPECIFIED HYPERLIPIDEMIA TYPE: ICD-10-CM

## 2024-03-11 DIAGNOSIS — I48.19 PERSISTENT ATRIAL FIBRILLATION (H): ICD-10-CM

## 2024-03-11 DIAGNOSIS — I10 ESSENTIAL HYPERTENSION: ICD-10-CM

## 2024-03-11 PROCEDURE — G2211 COMPLEX E/M VISIT ADD ON: HCPCS | Performed by: GENERAL ACUTE CARE HOSPITAL

## 2024-03-11 PROCEDURE — 99215 OFFICE O/P EST HI 40 MIN: CPT | Performed by: GENERAL ACUTE CARE HOSPITAL

## 2024-03-11 RX ORDER — SOTALOL HYDROCHLORIDE 80 MG/1
40 TABLET ORAL 2 TIMES DAILY
Qty: 90 TABLET | Refills: 3 | Status: SHIPPED | OUTPATIENT
Start: 2024-03-11

## 2024-03-11 NOTE — LETTER
3/11/2024    Carmen Stout, APRN CNP  [address] [facility] [address]    RE: Daquan Schmitt       Dear Colleague,     I had the pleasure of seeing Daquan Schmitt in the [facility].  HEART CARE ENCOUNTER NOTE        Assessment/Recommendations   Assessment:    Persistent atrial fibrillation status post direct current cardioversion. Currently maintaining sinus rhythm. [id number] score is at least 3 (hypertension, age 75 or greater). HAS-BLED score is at least 2 (age greater than 65, epistaxis).  Abnormal cardiovascular stress test 1/16/2024. I suspect this is a false positive as he is otherwise not having typical anginal symptoms and had minimal coronary artery calcification on CT 7/9/2021.  Fatigue and exertional dyspnea. I suspect this is a side effect of sotalol therapy.  Coronary artery calcification seen on chest CT 7/9/2021 with a total Agatston score of 9.  Family history of abdominal aortic aneurysm. Normal screening abdominal ultrasound 7/27/2020.  Essential hypertension. Elevated today, better controlled on other occasions.  Hyperlipidemia. Last  mg/dL.  Obstructive sleep apnea on CPAP.  Body mass index is 32.1 kg/m .    Plan:  CT coronary angiogram.  If he does not have obstructive coronary artery disease, will defer to our Atrial Fibrillation team if switching sotalol to flecainide is warranted.  If he has significant atherosclerosis on his CT, his statin therapy can be intensified.  Reduce sotalol to 40 mg twice daily.  If he continues to have symptomatic bradycardia from anti-arrhythmic therapy, I would favor stopping anti-arrhythmic therapy and pursuing ablation if his atrial fibrillation returns.  Continue apixaban 5 mg twice daily.  Discontinue aspirin.  We discussed percutaneous left atrial appendage closure. He will consider this.  Follow-up with me in 1 year.    (Patient) Has documented nonvalvular atrial fibrillation (NVAF) and is currently on oral  anticoagulant therapy Eliquis   [id number] -VASc = 3 (hypertension, age 75 or greater).  HAS-BLED risk score: 2 (age greater than 65, epistaxis).  Indication(s) to consider non-oral anticoagulation therapy: epistaxis  Pt has no contra-indication to come off oral anti-coagulant therapy if LAAC device is successfully placed.     I have personally reviewed the patients chart and discussed the following with the patient/family; 1) The choices available for reducing stroke risk from atrial fibrillation, 2) Treatment options available including respective risk/benefits, and 3) What factors are most important for the patient in making their decision.  The [facility] shared decision making tool https://www.cardiosmart.org/SDM/Decision-Aids/Find-Decision-Aids/Atrial-Fibrillation  was used to guide this conversation.   The patient was counseled that their decision could be made at this time or in the future if more time was needed to consider their decision.       The patient is an appropriate candidate to proceed with left atrial appendage screening and implant.       A total time of 40 minutes was spent on the date of this encounter.    The longitudinal plan of care for the diagnosis(es)/condition(s) as documented were addressed during this visit. Due to the added complexity in care, I will continue to support Daquan in the subsequent management and with ongoing continuity of care.    Persistent atrial fibrillation.    History of Present Illness   Mr. Daquan Schmitt is a 78 year old male with a significant past history of persistent AF s/p DCCV 9/1/2023, MARY on CPAP, HTN and HLD presenting to establish care in general cardiology.    He did not really have any symptoms in AF but noticed his pulse was twice as fast as it should be. His resting HR is normally in the 50s. A Holter monitor showed he was in persistent AF. He was started on sotalol 80 mg BID in addition to continuing apixaban and had DCCV 9/1/2023. His pulse was in the  40s afterwards prompting a reduction in sotalol dosing to 60 mg BID. It was planned to try flecainide therapy but this was not done given his abnormal stress test as discussed below.    He feels worse since the DCCV, getting fatigued easily and out of breath with exertion. He had an exercise nuclear stress test 1/16/2024 where he was able to get his HR up to 134 bpm (and says he was on his sotalol during the test) but there were subtle ECG changes as well as suggestion of infarction but no ischemia on perfusion imaging.    No chest pain/pressure/tightness, shortness of breath at rest, light headedness/dizziness, pre-syncope, syncope, lower extremity swelling, palpitations, paroxysmal nocturnal dyspnea (PND), or orthopnea.    He has had nosebleeds since starting apixaban. He is taking aspirin as well.     Cardiac Problems and Cardiac Diagnostics     Most Recent Cardiac testing:  ECG dated 9/1/2023 (personaly reviewed and interpreted): sinus bradycardia HR 47 bpm, nonspecific T wave abnormality    Holter monitor 6/20/2023 (report reviewed):  Holter monitoring (duration 24hrs).   Continuous atrial fibrillation, 66 to 131 bpm, average 91 bpm.   There were no pauses of greater than 3 seconds.   Rare premature ventricular contractions (<1%).   Symptom triggers none.     ECHO 7/25/2023 (report reviewed):   The left ventricle is normal in size.  Left ventricular function is normal.The ejection fraction is 55-60%.  The right ventricle is mildly dilated.  Mildly decreased right ventricular systolic function  The left atrium is moderately dilated.  There is mild (1+) mitral regurgitation.  Sclerosis of the aortic valve. Aortic valve not well visualized.    Stress test 1/16/2024 (report reviewed):      The patient is at an intermediate risk of future cardiac ischemic events.     Exercise stress ECG positive for ischemia with 1-2 mm horizontal to downsloping ST depression in the inferolateral leads.  Patient develops runs of  wide-complex tachycardia near end of exercise which resolves in mid recovery.  This may be a rate related left bundle branch block.     The nuclear stress test is mildly abnormal.  There is a small area of nontransmural infarction involving the distal inferolateral wall.  No definite area of ischemia.     The left ventricular ejection fraction at stress is 50% with global hypokinesis.     There is no prior study for comparison.    CT coronary calcium score 7/9/2021 (report reviewed):     The total Agatston calcium score is 9. A calcium score in this range places the individual in the 9th percentile when compared to an age and gender matched control group based on the [place] database.. This would imply an overall lower risk of cardiovascular events in the next 10 years.    Small amount of calcium measuring less than 1 in the right coronary artery and not included in the score.    Calcification of the aortic valve.    Please see separate report by radiology for additional findings.    Abdominal ultrasound 7/27/2020 (report reviewed):  MEASUREMENTS:   Proximal Aorta: 2.3 x 2.3 cm.   Mid Aorta: 2.2 x 2.0 cm.   Distal Aorta: 1.9 x 1.9 cm.   Right Common Iliac Artery: 1.0 cm.   Left Common Iliac Artery: 1.0 cm.     IMPRESSION:   1. No evidence of aortoiliac aneurysm.      Medications  Allergies   Current Outpatient Medications   Medication Sig Dispense Refill     apixaban ANTICOAGULANT (ELIQUIS ANTICOAGULANT) 5 MG tablet Take 1 tablet by mouth twice daily 180 tablet 3     ascorbic acid (VITAMIN C) 500 MG CPCR CR capsule Take 500 mg by mouth daily       cetirizine (ZYRTEC) 10 MG tablet Take 10 mg by mouth daily       co-enzyme Q-10 100 MG CAPS capsule Take 100 mg by mouth daily       finasteride (PROSCAR) 5 MG tablet Take 1 tablet (5 mg) by mouth daily 30 tablet 1     lisinopril-hydrochlorothiazide (ZESTORETIC) 20-12.5 MG tablet Take 2 tablets by mouth daily       melatonin 5 MG CAPS Take 5 mg by mouth nightly as needed        pravastatin (PRAVACHOL) 40 MG tablet Take 40 mg by mouth daily       sildenafil (REVATIO) 20 MG tablet Take 1 tablet by mouth daily as needed (only as needed not every day)       sotalol (BETAPACE) 120 MG tablet Take 0.5 tablets (60 mg) by mouth 2 times daily 180 tablet 1      Allergies   Allergen Reactions     Nickel Rash        Physical Examination Review of Systems   BP (!) 144/80 (BP Location: Right arm, Patient Position: Sitting, Cuff Size: Adult Large)   Pulse 52   Resp 14   Wt 113.4 kg (250 lb)   BMI 32.10 kg/m    Body mass index is 32.1 kg/m .  Wt Readings from Last 3 Encounters:   03/11/24 113.4 kg (250 lb)   10/06/23 113 kg (249 lb 1.6 oz)   09/01/23 108.9 kg (240 lb)       General Appearance:   Pleasant  male, appears  stated age. no acute distress, normal body habitus   ENT/Mouth: membranes moist, no apparent gingival bleeding.      EYES:  no scleral icterus, normal conjunctivae   Neck: no carotid bruits. No anterior cervical lymphadenopaty   Respiratory:   lungs are clear to auscultation, no rales or wheezing,  equal chest wall expansion    Cardiovascular:   Regular rhythm, normal rate. Normal first and second heart sounds with no murmurs, rubs, or gallops; the carotid, radial and posterior tibial pulses are intact, Jugular venous pressure normal, no edema bilaterally    Abdomen/GI:  no organomegaly, masses, bruits, or tenderness; bowel sounds are present   Extremities: no cyanosis or clubbing   Skin: no xanthelasma, warm.    Heme/lymph/ Immunology No apparent bleeding noted.   Neurologic: Alert and oriented. normal gait, no tremors     Psychiatric: Pleasant, calm, appropriate affect.    A complete 10 system review of systems was performed and is negative except as mentioned in the HPI/subjective.         Past History   Past Medical History:   Past Medical History:   Diagnosis Date     Atrial fibrillation (H)      Coronary artery calcification seen on CT scan      Essential hypertension       Hyperlipidemia      MARY (obstructive sleep apnea)     on CPAP     Sensorineural hearing loss, bilateral        Past Surgical History:   Past Surgical History:   Procedure Laterality Date     PENILE ADHESIONS LYSIS       VASECTOMY         Family History:   Family History   Problem Relation Age of Onset     Myocardial Infarction Mother 54     Aortic aneurysm Father 70     Aortic aneurysm Brother 63       Social History:   Social History     Socioeconomic History     Marital status:      Spouse name: Not on file     Number of children: Not on file     Years of education: Not on file     Highest education level: Not on file   Occupational History     Not on file   Tobacco Use     Smoking status: Former     Years: 20     Types: Cigarettes     Smokeless tobacco: Never   Vaping Use     Vaping Use: Never used   Substance and Sexual Activity     Alcohol use: Not on file     Drug use: Never     Sexual activity: Not on file   Other Topics Concern     Not on file   Social History Narrative     Not on file     Social Determinants of Health     Financial Resource Strain: Not on file   Food Insecurity: Not on file   Transportation Needs: Not on file   Physical Activity: Not on file   Stress: Not on file   Social Connections: Not on file   Interpersonal Safety: Not on file   Housing Stability: Not on file              Lab Results    Chemistry/lipid CBC Cardiac Enzymes/BNP/TSH/INR   Lab Results   Component Value Date    CR 1.00 06/05/2023    BUN 16 06/05/2023    POTASSIUM 4.4 06/05/2023     06/05/2023    CO2 26 06/05/2023 11/24/2023  Cholesterol  0 - 199 mg/dL 200 High    Triglyceride  <=149 mg/dL 218 High    HDL Cholesterol  >=40 mg/dL 42   LDL, Calculated  <130 mg/dL 114   Non HDL Chol, Calculated  <=159 mg/dL 158   Cholesterol/HDL Ratio  <=5.0 4.8      Lab Results   Component Value Date    WBC 10.0 06/05/2023    HGB 15.2 06/05/2023    HCT 43.8 06/05/2023    MCV 91 06/05/2023     06/05/2023    Lab Results    Component Value Date    TROPONINI 0.01 06/05/2023    TSH 1.12 06/05/2023    INR 0.94 06/05/2023          Tang Aguayo MD [facility]  Non-Invasive Cardiologist  [facility]  Pager [phone]      Thank you for allowing me to participate in the care of your patient.      Sincerely,     Tang Aguayo MD     [facility]  cc:   TELLY Garcia [facility]  [facility] [address]  [address] [place] [address]

## 2024-03-11 NOTE — PATIENT INSTRUCTIONS
We can try reducing your sotalol to 40 mg twice a day.  We will schedule a CT scan to make sure your arteries are not clogged.  If your arteries look okay, your Atrial Fibrillation team may switch sotalol to flecainide.   If you continue to have low heart rates, we may stop heart rate medication altogether and consider an ablation if your atrial fibrillation returns.  Also let us know if you are interested in a Watchman device to come off blood thinners.  You can stop aspirin.  See me in 1 year.

## 2024-03-11 NOTE — PROGRESS NOTES
HEART CARE ENCOUNTER NOTE        Assessment/Recommendations   Assessment:    Persistent atrial fibrillation status post direct current cardioversion. Currently maintaining sinus rhythm. VSM3VA1-PEFl score is at least 3 (hypertension, age 75 or greater). HAS-BLED score is at least 2 (age greater than 65, epistaxis).  Abnormal cardiovascular stress test 1/16/2024. I suspect this is a false positive as he is otherwise not having typical anginal symptoms and had minimal coronary artery calcification on CT 7/9/2021.  Fatigue and exertional dyspnea. I suspect this is a side effect of sotalol therapy.  Coronary artery calcification seen on chest CT 7/9/2021 with a total Agatston score of 9.  Family history of abdominal aortic aneurysm. Normal screening abdominal ultrasound 7/27/2020.  Essential hypertension. Elevated today, better controlled on other occasions.  Hyperlipidemia. Last  mg/dL.  Obstructive sleep apnea on CPAP.  Body mass index is 32.1 kg/m .    Plan:  CT coronary angiogram.  If he does not have obstructive coronary artery disease, will defer to our Atrial Fibrillation team if switching sotalol to flecainide is warranted.  If he has significant atherosclerosis on his CT, his statin therapy can be intensified.  Reduce sotalol to 40 mg twice daily.  If he continues to have symptomatic bradycardia from anti-arrhythmic therapy, I would favor stopping anti-arrhythmic therapy and pursuing ablation if his atrial fibrillation returns.  Continue apixaban 5 mg twice daily.  Discontinue aspirin.  We discussed percutaneous left atrial appendage closure. He will consider this.  Follow-up with me in 1 year.    (Patient) Has documented nonvalvular atrial fibrillation (NVAF) and is currently on oral anticoagulant therapy Eliquis   VUS2GC2 -VASc = 3 (hypertension, age 75 or greater).  HAS-BLED risk score: 2 (age greater than 65, epistaxis).  Indication(s) to consider non-oral anticoagulation therapy: epistaxis  Pt has  no contra-indication to come off oral anti-coagulant therapy if LAAC device is successfully placed.     I have personally reviewed the patients chart and discussed the following with the patient/family; 1) The choices available for reducing stroke risk from atrial fibrillation, 2) Treatment options available including respective risk/benefits, and 3) What factors are most important for the patient in making their decision.  The [facility] shared decision making tool https://www.cardiosmart.org/SDM/Decision-Aids/Find-Decision-Aids/Atrial-Fibrillation  was used to guide this conversation.   The patient was counseled that their decision could be made at this time or in the future if more time was needed to consider their decision.       The patient is an appropriate candidate to proceed with left atrial appendage screening and implant.       A total time of 40 minutes was spent on the date of this encounter.    The longitudinal plan of care for the diagnosis(es)/condition(s) as documented were addressed during this visit. Due to the added complexity in care, I will continue to support Daquan in the subsequent management and with ongoing continuity of care.    Persistent atrial fibrillation.    History of Present Illness   Mr. Daquan Schmitt is a 78 year old male with a significant past history of persistent AF s/p DCCV 9/1/2023, MARY on CPAP, HTN and HLD presenting to establish care in general cardiology.    He did not really have any symptoms in AF but noticed his pulse was twice as fast as it should be. His resting HR is normally in the 50s. A Holter monitor showed he was in persistent AF. He was started on sotalol 80 mg BID in addition to continuing apixaban and had DCCV 9/1/2023. His pulse was in the 40s afterwards prompting a reduction in sotalol dosing to 60 mg BID. It was planned to try flecainide therapy but this was not done given his abnormal stress test as discussed below.    He feels worse since the DCCV,  getting fatigued easily and out of breath with exertion. He had an exercise nuclear stress test 1/16/2024 where he was able to get his HR up to 134 bpm (and says he was on his sotalol during the test) but there were subtle ECG changes as well as suggestion of infarction but no ischemia on perfusion imaging.    No chest pain/pressure/tightness, shortness of breath at rest, light headedness/dizziness, pre-syncope, syncope, lower extremity swelling, palpitations, paroxysmal nocturnal dyspnea (PND), or orthopnea.    He has had nosebleeds since starting apixaban. He is taking aspirin as well.     Cardiac Problems and Cardiac Diagnostics     Most Recent Cardiac testing:  ECG dated 9/1/2023 (personaly reviewed and interpreted): sinus bradycardia HR 47 bpm, nonspecific T wave abnormality    Holter monitor 6/20/2023 (report reviewed):  Holter monitoring (duration 24hrs).   Continuous atrial fibrillation, 66 to 131 bpm, average 91 bpm.   There were no pauses of greater than 3 seconds.   Rare premature ventricular contractions (<1%).   Symptom triggers none.     ECHO 7/25/2023 (report reviewed):   The left ventricle is normal in size.  Left ventricular function is normal.The ejection fraction is 55-60%.  The right ventricle is mildly dilated.  Mildly decreased right ventricular systolic function  The left atrium is moderately dilated.  There is mild (1+) mitral regurgitation.  Sclerosis of the aortic valve. Aortic valve not well visualized.    Stress test 1/16/2024 (report reviewed):     The patient is at an intermediate risk of future cardiac ischemic events.    Exercise stress ECG positive for ischemia with 1-2 mm horizontal to downsloping ST depression in the inferolateral leads.  Patient develops runs of wide-complex tachycardia near end of exercise which resolves in mid recovery.  This may be a rate related left bundle branch block.    The nuclear stress test is mildly abnormal.  There is a small area of nontransmural  infarction involving the distal inferolateral wall.  No definite area of ischemia.    The left ventricular ejection fraction at stress is 50% with global hypokinesis.    There is no prior study for comparison.    CT coronary calcium score 7/9/2021 (report reviewed):     The total Agatston calcium score is 9. A calcium score in this range places the individual in the 9th percentile when compared to an age and gender matched control group based on the [employer] database.. This would imply an overall lower risk of cardiovascular events in the next 10 years.    Small amount of calcium measuring less than 1 in the right coronary artery and not included in the score.    Calcification of the aortic valve.    Please see separate report by radiology for additional findings.    Abdominal ultrasound 7/27/2020 (report reviewed):  MEASUREMENTS:   Proximal Aorta: 2.3 x 2.3 cm.   Mid Aorta: 2.2 x 2.0 cm.   Distal Aorta: 1.9 x 1.9 cm.   Right Common Iliac Artery: 1.0 cm.   Left Common Iliac Artery: 1.0 cm.     IMPRESSION:   1. No evidence of aortoiliac aneurysm.      Medications  Allergies   Current Outpatient Medications   Medication Sig Dispense Refill    apixaban ANTICOAGULANT (ELIQUIS ANTICOAGULANT) 5 MG tablet Take 1 tablet by mouth twice daily 180 tablet 3    ascorbic acid (VITAMIN C) 500 MG CPCR CR capsule Take 500 mg by mouth daily      cetirizine (ZYRTEC) 10 MG tablet Take 10 mg by mouth daily      co-enzyme Q-10 100 MG CAPS capsule Take 100 mg by mouth daily      finasteride (PROSCAR) 5 MG tablet Take 1 tablet (5 mg) by mouth daily 30 tablet 1    lisinopril-hydrochlorothiazide (ZESTORETIC) 20-12.5 MG tablet Take 2 tablets by mouth daily      melatonin 5 MG CAPS Take 5 mg by mouth nightly as needed      pravastatin (PRAVACHOL) 40 MG tablet Take 40 mg by mouth daily      sildenafil (REVATIO) 20 MG tablet Take 1 tablet by mouth daily as needed (only as needed not every day)      sotalol (BETAPACE) 120 MG tablet Take 0.5 tablets  (60 mg) by mouth 2 times daily 180 tablet 1      Allergies   Allergen Reactions    Nickel Rash        Physical Examination Review of Systems   BP (!) 144/80 (BP Location: Right arm, Patient Position: Sitting, Cuff Size: Adult Large)   Pulse 52   Resp 14   Wt 113.4 kg (250 lb)   BMI 32.10 kg/m    Body mass index is 32.1 kg/m .  Wt Readings from Last 3 Encounters:   03/11/24 113.4 kg (250 lb)   10/06/23 113 kg (249 lb 1.6 oz)   09/01/23 108.9 kg (240 lb)       General Appearance:   Pleasant  male, appears  stated age. no acute distress, normal body habitus   ENT/Mouth: membranes moist, no apparent gingival bleeding.      EYES:  no scleral icterus, normal conjunctivae   Neck: no carotid bruits. No anterior cervical lymphadenopaty   Respiratory:   lungs are clear to auscultation, no rales or wheezing,  equal chest wall expansion    Cardiovascular:   Regular rhythm, normal rate. Normal first and second heart sounds with no murmurs, rubs, or gallops; the carotid, radial and posterior tibial pulses are intact, Jugular venous pressure normal, no edema bilaterally    Abdomen/GI:  no organomegaly, masses, bruits, or tenderness; bowel sounds are present   Extremities: no cyanosis or clubbing   Skin: no xanthelasma, warm.    Heme/lymph/ Immunology No apparent bleeding noted.   Neurologic: Alert and oriented. normal gait, no tremors     Psychiatric: Pleasant, calm, appropriate affect.    A complete 10 system review of systems was performed and is negative except as mentioned in the HPI/subjective.         Past History   Past Medical History:   Past Medical History:   Diagnosis Date    Atrial fibrillation (H)     Coronary artery calcification seen on CT scan     Essential hypertension     Hyperlipidemia     MARY (obstructive sleep apnea)     on CPAP    Sensorineural hearing loss, bilateral        Past Surgical History:   Past Surgical History:   Procedure Laterality Date    PENILE ADHESIONS LYSIS      VASECTOMY         Family  History:   Family History   Problem Relation Age of Onset    Myocardial Infarction Mother 54    Aortic aneurysm Father 70    Aortic aneurysm Brother 63       Social History:   Social History     Socioeconomic History    Marital status:      Spouse name: Not on file    Number of children: Not on file    Years of education: Not on file    Highest education level: Not on file   Occupational History    Not on file   Tobacco Use    Smoking status: Former     Years: 20     Types: Cigarettes    Smokeless tobacco: Never   Vaping Use    Vaping Use: Never used   Substance and Sexual Activity    Alcohol use: Not on file    Drug use: Never    Sexual activity: Not on file   Other Topics Concern    Not on file   Social History Narrative    Not on file     Social Determinants of Health     Financial Resource Strain: Not on file   Food Insecurity: Not on file   Transportation Needs: Not on file   Physical Activity: Not on file   Stress: Not on file   Social Connections: Not on file   Interpersonal Safety: Not on file   Housing Stability: Not on file              Lab Results    Chemistry/lipid CBC Cardiac Enzymes/BNP/TSH/INR   Lab Results   Component Value Date    CR 1.00 06/05/2023    BUN 16 06/05/2023    POTASSIUM 4.4 06/05/2023     06/05/2023    CO2 26 06/05/2023 11/24/2023  Cholesterol  0 - 199 mg/dL 200 High    Triglyceride  <=149 mg/dL 218 High    HDL Cholesterol  >=40 mg/dL 42   LDL, Calculated  <130 mg/dL 114   Non HDL Chol, Calculated  <=159 mg/dL 158   Cholesterol/HDL Ratio  <=5.0 4.8      Lab Results   Component Value Date    WBC 10.0 06/05/2023    HGB 15.2 06/05/2023    HCT 43.8 06/05/2023    MCV 91 06/05/2023     06/05/2023    Lab Results   Component Value Date    TROPONINI 0.01 06/05/2023    TSH 1.12 06/05/2023    INR 0.94 06/05/2023          Tang Aguayo MD [facility]  Non-Invasive Cardiologist  [facility]  Pager [phone]

## 2024-03-19 RX ORDER — DILTIAZEM HYDROCHLORIDE 5 MG/ML
10 INJECTION INTRAVENOUS
Status: CANCELLED | OUTPATIENT
Start: 2024-03-19

## 2024-03-19 RX ORDER — DILTIAZEM HYDROCHLORIDE 5 MG/ML
5 INJECTION INTRAVENOUS
Status: CANCELLED | OUTPATIENT
Start: 2024-03-19

## 2024-03-19 RX ORDER — LIDOCAINE 40 MG/G
CREAM TOPICAL
Status: CANCELLED | OUTPATIENT
Start: 2024-03-19

## 2024-04-01 ENCOUNTER — TELEPHONE (OUTPATIENT)
Dept: CARDIOLOGY | Facility: CLINIC | Age: 79
End: 2024-04-01
Payer: COMMERCIAL

## 2024-04-03 ENCOUNTER — HOSPITAL ENCOUNTER (OUTPATIENT)
Dept: CT IMAGING | Facility: CLINIC | Age: 79
Discharge: HOME | End: 2024-04-03
Attending: GENERAL ACUTE CARE HOSPITAL | Admitting: GENERAL ACUTE CARE HOSPITAL
Payer: COMMERCIAL

## 2024-04-03 VITALS
HEIGHT: 74 IN | BODY MASS INDEX: 32.08 KG/M2 | WEIGHT: 250 LBS | DIASTOLIC BLOOD PRESSURE: 58 MMHG | HEART RATE: 54 BPM | SYSTOLIC BLOOD PRESSURE: 119 MMHG

## 2024-04-03 DIAGNOSIS — I25.10 CORONARY ARTERY CALCIFICATION SEEN ON CT SCAN: ICD-10-CM

## 2024-04-03 DIAGNOSIS — R94.39 ABNORMAL CARDIOVASCULAR STRESS TEST: ICD-10-CM

## 2024-04-03 LAB
BSA FOR ECHO PROCEDURE: 1 M2
CREAT BLD-MCNC: 1.1 MG/DL (ref 0.7–1.3)
EGFRCR SERPLBLD CKD-EPI 2021: >60 ML/MIN/1.73M2

## 2024-04-03 PROCEDURE — 75574 CT ANGIO HRT W/3D IMAGE: CPT

## 2024-04-03 PROCEDURE — 75574 CT ANGIO HRT W/3D IMAGE: CPT | Mod: 26 | Performed by: STUDENT IN AN ORGANIZED HEALTH CARE EDUCATION/TRAINING PROGRAM

## 2024-04-03 PROCEDURE — 82565 ASSAY OF CREATININE: CPT

## 2024-04-03 PROCEDURE — 250N000011 HC RX IP 250 OP 636: Performed by: GENERAL ACUTE CARE HOSPITAL

## 2024-04-03 PROCEDURE — 250N000013 HC RX MED GY IP 250 OP 250 PS 637: Performed by: GENERAL ACUTE CARE HOSPITAL

## 2024-04-03 RX ORDER — IOPAMIDOL 755 MG/ML
100 INJECTION, SOLUTION INTRAVASCULAR ONCE
Status: COMPLETED | OUTPATIENT
Start: 2024-04-03 | End: 2024-04-03

## 2024-04-03 RX ORDER — METOPROLOL TARTRATE 1 MG/ML
5 INJECTION, SOLUTION INTRAVENOUS
Status: DISCONTINUED | OUTPATIENT
Start: 2024-04-03 | End: 2024-04-04 | Stop reason: HOSPADM

## 2024-04-03 RX ORDER — NITROGLYCERIN 0.4 MG/1
0.4 TABLET SUBLINGUAL ONCE
Status: COMPLETED | OUTPATIENT
Start: 2024-04-03 | End: 2024-04-03

## 2024-04-03 RX ADMIN — IOPAMIDOL 100 ML: 755 INJECTION, SOLUTION INTRAVENOUS at 07:25

## 2024-04-03 RX ADMIN — NITROGLYCERIN 0.4 MG: 0.4 TABLET SUBLINGUAL at 07:21

## 2024-04-08 DIAGNOSIS — I48.19 PERSISTENT ATRIAL FIBRILLATION (H): Primary | ICD-10-CM

## 2024-04-08 NOTE — RESULT ENCOUNTER NOTE
Can you please follow-up with him re AF, any issues on decreased sotalol? He should continue to consider ablation with bradycardia limiting AAD options. He could see EP MD in the next few months to discuss further as well. Thank you

## 2024-07-10 ENCOUNTER — OFFICE VISIT (OUTPATIENT)
Dept: CARDIOLOGY | Facility: CLINIC | Age: 79
End: 2024-07-10
Payer: COMMERCIAL

## 2024-07-10 VITALS
DIASTOLIC BLOOD PRESSURE: 78 MMHG | OXYGEN SATURATION: 96 % | SYSTOLIC BLOOD PRESSURE: 138 MMHG | WEIGHT: 252 LBS | RESPIRATION RATE: 16 BRPM | BODY MASS INDEX: 32.35 KG/M2 | HEART RATE: 48 BPM

## 2024-07-10 DIAGNOSIS — I10 PRIMARY HYPERTENSION: ICD-10-CM

## 2024-07-10 DIAGNOSIS — I48.19 PERSISTENT ATRIAL FIBRILLATION (H): Primary | ICD-10-CM

## 2024-07-10 PROCEDURE — 99215 OFFICE O/P EST HI 40 MIN: CPT | Performed by: INTERNAL MEDICINE

## 2024-07-10 PROCEDURE — G2211 COMPLEX E/M VISIT ADD ON: HCPCS | Performed by: INTERNAL MEDICINE

## 2024-07-10 RX ORDER — PREDNISOLONE ACETATE 10 MG/ML
1 SUSPENSION/ DROPS OPHTHALMIC 3 TIMES DAILY
COMMUNITY
Start: 2024-05-22

## 2024-07-10 RX ORDER — LISINOPRIL AND HYDROCHLOROTHIAZIDE 12.5; 2 MG/1; MG/1
2 TABLET ORAL DAILY
COMMUNITY

## 2024-07-10 NOTE — LETTER
"7/10/2024    TELLY Ascencio CNP  [address] [address]    RE: Daquan Daveyti       Dear Colleague,     I had the pleasure of seeing Daquan Schmitt in the [facility].     [facility]  Cardiac Electrophysiology  [address]  [place] [address]   Office: [phone]  Fax: [phone]     Cardiac Electrophysiology Consultation    Patient: Daquan Schmitt   : 1945     Referring Provider: Zee Garcia  Primary Care Provider: Carmen Stout APRN CNP    CHIEF COMPLAINT/REASON FOR CONSULTATION  Persistent atrial fibrillation    Assessment/Recommendations     Persistent atrial fibrillation: Initially diagnosed in  the patient reports no substantive change in his overall symptoms following his cardioversion and restoration of normal sinus rhythm last .  The patient does describe that he \"does not have a lot of energy\" and he attributes this possibly to his medical therapy.  He has been managed chronically on antiarrhythmic drug therapy (sotalol) but medical therapy has been limited due to bradycardia.  Currently the patient reports that essentially he is unchanged over the past 9 months.  The patient does have sinus bradycardia which she also confirms with daily resting heart rates in the 40s.  The patient's symptoms could be attributable to chronotropic incompetence resulting from his medical therapy.  We reviewed the underlying pathophysiology of atrial fibrillation as well as management considerations including managing stroke risk, rate control, cardioversion, antiarrhythmic drug therapy, and catheter ablation. We discussed the ongoing importance of lifestyle modification (maintaining a healthy weight, sleep apnea diagnosis and management, alcohol avoidance) as part of a long term strategy for atrial fibrillation management.  [id number] score 4.  The patient currently is taking apixaban.   Arrhythmia " Please offer 3/2/22 with Nakul Manning NP this will be four weeks from procedure date. Thank you   "hx  Dx/date: June 2023  Sx: No clearly defined symptoms  Prior AAD, AV gudelia blocking agents: Sotalol  Procedures  DCCV: 9/1/2023  Ablation: N/A    Coronary atherosclerosis, native vessel: Diagnosed by CTA March 2024 with nonobstructive CAD.  Aggressive risk factor modification recommended.    Obstructive sleep apnea: Chronic problem with the patient he is compliant with his CPAP therapy.    Follow up/recommendations:   The patient will wear a 7 day Zio Patch monitor to assess heart rate response to activities of daily living and any symptomatic events.  No change in medications today.  Follow-up in the arrhythmia clinic with the EP TERRANCE in 6 Months.  At that time the patient could be considered for a trial off sotalol therapy to first see if the medication is causing his current symptoms and second determine if atrial fibrillation recurs whether or not he has associated symptoms.    Time spent: 45 minutes spent on the date of the encounter doing chart review, history and exam, documentation and further activities as noted above.       History of Present Illness   Daquan Schmitt is a 78 year old male with hypertension, obstructive sleep apnea and persistent atrial fibrillation referred by Zee Garcia CNP for consultation regarding persistent atrial fibrillation management.  The patient reports that he is maintaining sinus rhythm and checks his heart rates daily.  He reports that his resting heart rate is typically in the mid 40s following his cardioversion last September.  The patient states that he \"does not have a lot of energy\" and that this may be secondary to either the medication (sotalol) or his heart rate.  In further questioning he does not report that he had any clinical improvement following cardioversion last fall.  He is compliant with his CPAP therapy.  He reports no other cardiovascular symptoms at this time.       Physical Examination  Review of Systems   VITALS: /78 (BP Location: Right " arm, Patient Position: Sitting, Cuff Size: Adult Large)   Pulse (!) 48   Resp 16   Wt 114.3 kg (252 lb)   SpO2 96%   BMI 32.35 kg/m      Wt Readings from Last 3 Encounters:   04/03/24 113.4 kg (250 lb)   03/11/24 113.4 kg (250 lb)   10/06/23 113 kg (249 lb 1.6 oz)     CONSTITUTIONAL: well nourished, comfortable, no distress  EYES:  Conjunctivae pink, sclerae clear.    E/N/T:  Oral mucosa pink  RESPIRATORY:  Respiratory effort is normal  CARDIOVASCULAR: Regular bradycardia with normal S1 and S2  GASTROINTESTINAL:  Abdomen without masses or tenderness  EXTREMITIES:  No clubbing or cyanosis.    MUSCULOSKELETAL:  Overall grossly normal muscle strength  SKIN:  Overall, skin warm and dry, no lesions.  NEURO/PSYCH:  Oriented x 3 with normal affect.   Constitutional:  No weight loss or loss of appetite    Eyes:  No difficulty with vision, no double vision, no dry eyes  ENT:  No sore throat, difficulty swallowing; changes in hearing or tinnitus  Cardiovascular: As detailed above  Respiratory:  No cough  Musculoskeletal  No joint pain, muscle aches  Neurologic:  No syncope, lightheadedness, fainting spells   Hematologic: No easy bruising, excessive bleeding tendency   Gastrointestinal:  No jaundice, abdominal pain or abdominal bloating  Genitourinary: No changes in urinary habits, no trouble urinating    Psychiatric: No anxiety or depression      Medical History  Surgical History   Past Medical History:   Diagnosis Date    Atrial fibrillation (H)     Coronary artery calcification seen on CT scan     Essential hypertension     Hyperlipidemia     MARY (obstructive sleep apnea)     on CPAP    Sensorineural hearing loss, bilateral     Past Surgical History:   Procedure Laterality Date    PENILE ADHESIONS LYSIS      VASECTOMY           Family History Social History   Family History   Problem Relation Age of Onset    Myocardial Infarction Mother 54    Aortic aneurysm Father 70    Aortic aneurysm Brother 63        Social History  "    Tobacco Use    Smoking status: Former     Types: Cigarettes    Smokeless tobacco: Never   Vaping Use    Vaping status: Never Used   Substance Use Topics    Drug use: Never         Medications  Allergies     Current Outpatient Medications:     apixaban ANTICOAGULANT (ELIQUIS ANTICOAGULANT) 5 MG tablet, Take 1 tablet by mouth twice daily, Disp: 180 tablet, Rfl: 3    ascorbic acid (VITAMIN C) 500 MG CPCR CR capsule, Take 500 mg by mouth daily, Disp: , Rfl:     cetirizine (ZYRTEC) 10 MG tablet, Take 10 mg by mouth daily, Disp: , Rfl:     co-enzyme Q-10 100 MG CAPS capsule, Take 100 mg by mouth daily, Disp: , Rfl:     finasteride (PROSCAR) 5 MG tablet, Take 1 tablet (5 mg) by mouth daily, Disp: 30 tablet, Rfl: 1    lisinopril-hydrochlorothiazide (ZESTORETIC) 20-12.5 MG tablet, Take 2 tablets by mouth daily, Disp: , Rfl:     melatonin 5 MG CAPS, Take 5 mg by mouth nightly as needed, Disp: , Rfl:     pravastatin (PRAVACHOL) 40 MG tablet, Take 40 mg by mouth daily, Disp: , Rfl:     sildenafil (REVATIO) 20 MG tablet, Take 1 tablet by mouth daily as needed (only as needed not every day), Disp: , Rfl:     sotalol (BETAPACE) 80 MG tablet, Take 0.5 tablets (40 mg) by mouth 2 times daily, Disp: 90 tablet, Rfl: 3     Allergies   Allergen Reactions    Nickel Rash          Lab Results    Chemistry CBC Cardiac Enzymes/BNP/TSH/INR   Recent Labs   Lab Test 04/03/24  0722 06/05/23  2338   NA  --  139   POTASSIUM  --  4.4   CHLORIDE  --  102   CO2  --  26   GLC  --  137*   BUN  --  16   CR 1.1 1.00   GFRESTIMATED >60 78   ZAIDA  --  9.7     Recent Labs   Lab Test 04/03/24  0722 06/05/23  2338   CR 1.1 1.00          Recent Labs   Lab Test 06/05/23  2137   WBC 10.0   HGB 15.2   HCT 43.8   MCV 91        Recent Labs   Lab Test 06/05/23  2137   HGB 15.2    Recent Labs   Lab Test 06/05/23  2137   TROPONINI 0.01     No results for input(s): \"BNP\", \"NTBNPI\", \"NTBNP\" in the last 44638 hours.  Recent Labs   Lab Test 06/05/23  2137   TSH " 1.12     Recent Labs   Lab Test 06/05/23  2137   INR 0.94         Data Review    ECGs dated 9/1/2023  (tracings independently reviewed)  Sinus bradycardia with rates in the 40s, no acute changes.    Echocardiogram (TTE) dated 7/25/2023  The left ventricle is normal in size.  Left ventricular function is normal.The ejection fraction is 55-60%.  The right ventricle is mildly dilated.  Mildly decreased right ventricular systolic function  The left atrium is moderately dilated.  There is mild (1+) mitral regurgitation.  Sclerosis of the aortic valve. Aortic valve not well visualized.    Coronary CTA 4/3/2024    No evidence for obstructive coronary stenosis.  Mild non-obstructive CAD noted in the LAD.    Consider false positive stress test.    Aggressive risk factor modification.       Thank you for allowing me to participate in the care of your patient.      Sincerely,     Charles Rose MD     [facility]  cc:   TELLY Garcia [facility]  [facility] [address]  [address] [place] [address]

## 2024-07-10 NOTE — PROGRESS NOTES
"[facility]  Cardiac Electrophysiology  [address]  [place] [address]   Office: [phone]  Fax: [phone]     Cardiac Electrophysiology Consultation    Patient: Daquan Schmitt   : 1945     Referring Provider: Zee Garcia  Primary Care Provider: Carmen Stout APRN CNP    CHIEF COMPLAINT/REASON FOR CONSULTATION  Persistent atrial fibrillation    Assessment/Recommendations     Persistent atrial fibrillation: Initially diagnosed in  the patient reports no substantive change in his overall symptoms following his cardioversion and restoration of normal sinus rhythm last .  The patient does describe that he \"does not have a lot of energy\" and he attributes this possibly to his medical therapy.  He has been managed chronically on antiarrhythmic drug therapy (sotalol) but medical therapy has been limited due to bradycardia.  Currently the patient reports that essentially he is unchanged over the past 9 months.  The patient does have sinus bradycardia which she also confirms with daily resting heart rates in the 40s.  The patient's symptoms could be attributable to chronotropic incompetence resulting from his medical therapy.  We reviewed the underlying pathophysiology of atrial fibrillation as well as management considerations including managing stroke risk, rate control, cardioversion, antiarrhythmic drug therapy, and catheter ablation. We discussed the ongoing importance of lifestyle modification (maintaining a healthy weight, sleep apnea diagnosis and management, alcohol avoidance) as part of a long term strategy for atrial fibrillation management.  [id number] score 4.  The patient currently is taking apixaban.   Arrhythmia hx  Dx/date: 2023  Sx: No clearly defined symptoms  Prior AAD, AV gudelia blocking agents: Sotalol  Procedures  DCCV: 2023  Ablation: N/A    Coronary atherosclerosis, native vessel: Diagnosed by CTA 2024 with " "nonobstructive CAD.  Aggressive risk factor modification recommended.    Obstructive sleep apnea: Chronic problem with the patient he is compliant with his CPAP therapy.    Follow up/recommendations:   The patient will wear a 7 day Zio Patch monitor to assess heart rate response to activities of daily living and any symptomatic events.  No change in medications today.  Follow-up in the arrhythmia clinic with the EP TERRANCE in 6 Months.  At that time the patient could be considered for a trial off sotalol therapy to first see if the medication is causing his current symptoms and second determine if atrial fibrillation recurs whether or not he has associated symptoms.    Time spent: 45 minutes spent on the date of the encounter doing chart review, history and exam, documentation and further activities as noted above.       History of Present Illness   Daquan Schmitt is a 78 year old male with hypertension, obstructive sleep apnea and persistent atrial fibrillation referred by Zee Garcia CNP for consultation regarding persistent atrial fibrillation management.  The patient reports that he is maintaining sinus rhythm and checks his heart rates daily.  He reports that his resting heart rate is typically in the mid 40s following his cardioversion last September.  The patient states that he \"does not have a lot of energy\" and that this may be secondary to either the medication (sotalol) or his heart rate.  In further questioning he does not report that he had any clinical improvement following cardioversion last fall.  He is compliant with his CPAP therapy.  He reports no other cardiovascular symptoms at this time.       Physical Examination  Review of Systems   VITALS: /78 (BP Location: Right arm, Patient Position: Sitting, Cuff Size: Adult Large)   Pulse (!) 48   Resp 16   Wt 114.3 kg (252 lb)   SpO2 96%   BMI 32.35 kg/m      Wt Readings from Last 3 Encounters:   04/03/24 113.4 kg (250 lb)   03/11/24 " 113.4 kg (250 lb)   10/06/23 113 kg (249 lb 1.6 oz)     CONSTITUTIONAL: well nourished, comfortable, no distress  EYES:  Conjunctivae pink, sclerae clear.    E/N/T:  Oral mucosa pink  RESPIRATORY:  Respiratory effort is normal  CARDIOVASCULAR: Regular bradycardia with normal S1 and S2  GASTROINTESTINAL:  Abdomen without masses or tenderness  EXTREMITIES:  No clubbing or cyanosis.    MUSCULOSKELETAL:  Overall grossly normal muscle strength  SKIN:  Overall, skin warm and dry, no lesions.  NEURO/PSYCH:  Oriented x 3 with normal affect.   Constitutional:  No weight loss or loss of appetite    Eyes:  No difficulty with vision, no double vision, no dry eyes  ENT:  No sore throat, difficulty swallowing; changes in hearing or tinnitus  Cardiovascular: As detailed above  Respiratory:  No cough  Musculoskeletal  No joint pain, muscle aches  Neurologic:  No syncope, lightheadedness, fainting spells   Hematologic: No easy bruising, excessive bleeding tendency   Gastrointestinal:  No jaundice, abdominal pain or abdominal bloating  Genitourinary: No changes in urinary habits, no trouble urinating    Psychiatric: No anxiety or depression      Medical History  Surgical History   Past Medical History:   Diagnosis Date    Atrial fibrillation (H)     Coronary artery calcification seen on CT scan     Essential hypertension     Hyperlipidemia     MARY (obstructive sleep apnea)     on CPAP    Sensorineural hearing loss, bilateral     Past Surgical History:   Procedure Laterality Date    PENILE ADHESIONS LYSIS      VASECTOMY           Family History Social History   Family History   Problem Relation Age of Onset    Myocardial Infarction Mother 54    Aortic aneurysm Father 70    Aortic aneurysm Brother 63        Social History     Tobacco Use    Smoking status: Former     Types: Cigarettes    Smokeless tobacco: Never   Vaping Use    Vaping status: Never Used   Substance Use Topics    Drug use: Never         Medications  Allergies  "    Current Outpatient Medications:     apixaban ANTICOAGULANT (ELIQUIS ANTICOAGULANT) 5 MG tablet, Take 1 tablet by mouth twice daily, Disp: 180 tablet, Rfl: 3    ascorbic acid (VITAMIN C) 500 MG CPCR CR capsule, Take 500 mg by mouth daily, Disp: , Rfl:     cetirizine (ZYRTEC) 10 MG tablet, Take 10 mg by mouth daily, Disp: , Rfl:     co-enzyme Q-10 100 MG CAPS capsule, Take 100 mg by mouth daily, Disp: , Rfl:     finasteride (PROSCAR) 5 MG tablet, Take 1 tablet (5 mg) by mouth daily, Disp: 30 tablet, Rfl: 1    lisinopril-hydrochlorothiazide (ZESTORETIC) 20-12.5 MG tablet, Take 2 tablets by mouth daily, Disp: , Rfl:     melatonin 5 MG CAPS, Take 5 mg by mouth nightly as needed, Disp: , Rfl:     pravastatin (PRAVACHOL) 40 MG tablet, Take 40 mg by mouth daily, Disp: , Rfl:     sildenafil (REVATIO) 20 MG tablet, Take 1 tablet by mouth daily as needed (only as needed not every day), Disp: , Rfl:     sotalol (BETAPACE) 80 MG tablet, Take 0.5 tablets (40 mg) by mouth 2 times daily, Disp: 90 tablet, Rfl: 3     Allergies   Allergen Reactions    Nickel Rash          Lab Results    Chemistry CBC Cardiac Enzymes/BNP/TSH/INR   Recent Labs   Lab Test 04/03/24 0722 06/05/23  2338   NA  --  139   POTASSIUM  --  4.4   CHLORIDE  --  102   CO2  --  26   GLC  --  137*   BUN  --  16   CR 1.1 1.00   GFRESTIMATED >60 78   ZAIDA  --  9.7     Recent Labs   Lab Test 04/03/24  0722 06/05/23  2338   CR 1.1 1.00          Recent Labs   Lab Test 06/05/23 2137   WBC 10.0   HGB 15.2   HCT 43.8   MCV 91        Recent Labs   Lab Test 06/05/23 2137   HGB 15.2    Recent Labs   Lab Test 06/05/23 2137   TROPONINI 0.01     No results for input(s): \"BNP\", \"NTBNPI\", \"NTBNP\" in the last 65871 hours.  Recent Labs   Lab Test 06/05/23 2137   TSH 1.12     Recent Labs   Lab Test 06/05/23 2137   INR 0.94         Data Review    ECGs dated 9/1/2023  (tracings independently reviewed)  Sinus bradycardia with rates in the 40s, no acute " changes.    Echocardiogram (TTE) dated 7/25/2023  The left ventricle is normal in size.  Left ventricular function is normal.The ejection fraction is 55-60%.  The right ventricle is mildly dilated.  Mildly decreased right ventricular systolic function  The left atrium is moderately dilated.  There is mild (1+) mitral regurgitation.  Sclerosis of the aortic valve. Aortic valve not well visualized.    Coronary CTA 4/3/2024    No evidence for obstructive coronary stenosis.  Mild non-obstructive CAD noted in the LAD.    Consider false positive stress test.    Aggressive risk factor modification.       Cc:

## 2024-07-15 ENCOUNTER — TELEPHONE (OUTPATIENT)
Dept: CARDIOLOGY | Facility: CLINIC | Age: 79
End: 2024-07-15
Payer: COMMERCIAL

## 2024-07-15 NOTE — TELEPHONE ENCOUNTER
[facility] Call Center    Phone Message    May a detailed message be left on voicemail: yes    Reason for Call: Other: Patient called stating he never got his shipment for the ziopatch monitor. He was looking to update the address it will be going to. Please call patient back to further discuss.     Updated address:     [address] [address]    Action Taken: Message routed to:  Other: Cardiology    Travel Screening: Not Applicable     Thank you!  [facility]

## 2024-07-16 NOTE — TELEPHONE ENCOUNTER
Jenifer Metz RN  [facility] Support Pool - Lhe1 hour ago (11:31 AM)     EB  It looks like the order was released last night and currently pending shipment. I will try to edit the address, just call to double check and let me know if the address is accurate. -Holli Romero, Jenifer Jackson RN21 hours ago (3:32 PM)       Honorio monroe- can you check and see if this pts monitor was sent/returned?  I'm assuming we will need to order a new one so it will get sent to the correct address below?    Thanks!    Holli      appendicitis

## 2024-07-17 ENCOUNTER — ORDERS ONLY (AUTO-RELEASED) (OUTPATIENT)
Dept: CARDIOLOGY | Facility: CLINIC | Age: 79
End: 2024-07-17
Payer: COMMERCIAL

## 2024-07-17 DIAGNOSIS — I48.19 PERSISTENT ATRIAL FIBRILLATION (H): ICD-10-CM

## 2024-07-18 NOTE — TELEPHONE ENCOUNTER
Spoke with pt and he confirms address on file is correct but wanted it sent to a different location. He reports he spoke with the Zio patch team yesterday and this was already shipped. They agreed to send pt another Zio to his new address location if he does not receive the current shipment before he goes out of town. He does not need any further intervention from the clinic.      Julisa Luke RN

## 2024-08-21 PROCEDURE — 93248 EXT ECG>7D<15D REV&INTERPJ: CPT | Performed by: INTERNAL MEDICINE

## 2024-09-04 ENCOUNTER — TELEPHONE (OUTPATIENT)
Dept: CARDIOLOGY | Facility: CLINIC | Age: 79
End: 2024-09-04
Payer: COMMERCIAL

## 2024-09-04 NOTE — TELEPHONE ENCOUNTER
Dr Rose-  Pt saw you on 7/10, pt was c/o fatigue at that time on Sotalol.  Pt is wanting results, and per his MyChart msg I am thinking looking for possible changes to Sotalol prior to 6mo follow-up  Recommendations?  Thank you,  9/4/2024 8:58 AM  Phyllis Crowder RN    Your plan was below:  Follow up/recommendations:   The patient will wear a 7 day Zio Patch monitor to assess heart rate response to activities of daily living and any symptomatic events.  No change in medications today.  Follow-up in the arrhythmia clinic with the EP TERRANCE in 6 Months.  At that time the patient could be considered for a trial off sotalol therapy to first see if the medication is causing his current symptoms and second determine if atrial fibrillation recurs whether or not he has associated symptoms.    ZIO PATCH MAIL OUT  Order: [id number]  Status: Final result       Visible to patient: Yes (seen)       Dx: Persistent atrial fibrillation (H)    0 Result Notes  Details    Reading Physician Reading Date Result Priority   Narayan Nolan MD  [phone] 8/21/2024 Routine     Narrative & Impression  Zio monitoring from 8/7/2024 to 8/14/2024 (duration 7d 3h).  Predominant rhythm was sinus rhythm, 31 (8/13/2024 11:10a) to 113bpm, average 54bpm.  7 episodes of nonsustained supraventricular tachycardia - longest 11 beats, fastest 152bpm.  No sustained tachyarrhythmias.  No atrial fibrillation.  There were no pauses of greater than 3 seconds.  Rare supraventricular ectopic beats (isolated <1%).  Rare premature ventricular contractions (isolated <1%).  No symptoms recorded.     Electronically signed by Narayan Nolan MD  8/21/2024  9:51 AM

## 2024-09-04 NOTE — TELEPHONE ENCOUNTER
[facility] Call Center    Phone Message    May a detailed message be left on voicemail: yes     Reason for Call: Other: patient is calling as he did send a mychart a few days back as he waiting on results from cash regarding his zio patch, please call patient to advise, thank you.     Action Taken: Other: cardiology    Travel Screening: Not Applicable     Date of Service:

## 2024-09-11 NOTE — TELEPHONE ENCOUNTER
Health Call Center    Phone Message    May a detailed message be left on voicemail: yes     Reason for Call: Other: Patient called to get his results from his ZioPatch. August 14th was the last day he wore it, and he still hasn't heard anything. He requests that someone call to discuss those results and post them on the portal. Please call patient back to assist. Thank you!     Action Taken: Other: Cardiology    Travel Screening: Not Applicable     Thank you!  [facility]

## 2024-09-11 NOTE — TELEPHONE ENCOUNTER
1st Attempt to return pt call. LVM with direct number requesting return call.    Julisa Luke RN        Zio monitoring from 8/7/2024 to 8/14/2024 (duration 7d 3h).  Predominant rhythm was sinus rhythm, 31 (8/13/2024 11:10a) to 113bpm, average 54bpm.  7 episodes of nonsustained supraventricular tachycardia - longest 11 beats, fastest 152bpm.  No sustained tachyarrhythmias.  No atrial fibrillation.  There were no pauses of greater than 3 seconds.  Rare supraventricular ectopic beats (isolated <1%).  Rare premature ventricular contractions (isolated <1%).  No symptoms recorded.     Electronically signed by Narayan Nolan MD  8/21/2024  9:51 AM

## 2024-09-12 NOTE — TELEPHONE ENCOUNTER
2nd Attempt to contact pt, voicemail message was left with contact information and instructing pt to call back.  9/12/2024 9:33 AM  Phyllis Crowder RN

## 2024-09-12 NOTE — TELEPHONE ENCOUNTER
Dr Rose-  Please see again the msg below  Changes to Sotalol prior to his follow-up in 6mo with EP TERRANCE  Pt is aware of normal results as he called back and results were given, but again informed we were waiting for your review for further recommendations  Pt is aware he will be called once we hear back from you  Thank you,  9/12/2024 11:37 AM  Phyllis Crowder RN

## 2024-09-26 NOTE — TELEPHONE ENCOUNTER
Pt was called, corresponding information/recommendations reviewed, verbalized understanding, has no further questions at this time, and contact information was given for further concerns/questions   9/26/2024 12:16 PM  Phyllis Crowder RN

## 2024-09-26 NOTE — TELEPHONE ENCOUNTER
Patient Zio patch monitor results reviewed.  Patient wore monitor to assess heart rate response to activities of daily living and any symptoms.  The patient had no symptomatic recordings.  No profound bradycardia or significant/symptomatic pauses.  Recommend ongoing therapy and follow-upin my note.

## 2024-10-01 ENCOUNTER — TELEPHONE (OUTPATIENT)
Dept: CARDIOLOGY | Facility: CLINIC | Age: 79
End: 2024-10-01
Payer: COMMERCIAL

## 2024-10-01 NOTE — TELEPHONE ENCOUNTER
1st Attempt to return pt call. LVM with direct number for return call.    Pt is only being prescribed Sotalol and Eliquis by heart clinic, neither of which are for BP.     Julisa Luke RN

## 2024-10-01 NOTE — TELEPHONE ENCOUNTER
[facility] Call Center    Phone Message    May a detailed message be left on voicemail: yes     Reason for Call: Other: Pt would like  a call back to discuss his medication as his bp has maintained 120/70 and he would like to come off his medication and would like to discuss     Action Taken: Other: Cardio    Travel Screening: Not Applicable     Date of Service:

## 2024-10-03 NOTE — TELEPHONE ENCOUNTER
Pt returned phone call.    He said he meant to call regarding his pulse being in the low 40's. He is concerned with his HR going this low and would like to see if taking a smaller amount of Sotalol would help with his HR and overall energy levels.    He has not had any recurrence of AF since his DCCV 9/1/23.    Pt has [employer] mobile and checks twice daily with no sign of AF.     He is also inquiring about coming off Eliquis. Discussed reason behind staying on this but he would like to discuss with provider.     Noted pt follow up with EP TERRANCE was recommended for 1/2025 and pt understands to get this scheduled.    Julisa Luke RN

## 2024-10-08 ENCOUNTER — ANCILLARY PROCEDURE (OUTPATIENT)
Dept: GENERAL RADIOLOGY | Facility: CLINIC | Age: 79
End: 2024-10-08
Attending: PODIATRIST
Payer: COMMERCIAL

## 2024-10-08 ENCOUNTER — OFFICE VISIT (OUTPATIENT)
Dept: PODIATRY | Facility: CLINIC | Age: 79
End: 2024-10-08
Payer: COMMERCIAL

## 2024-10-08 VITALS
DIASTOLIC BLOOD PRESSURE: 74 MMHG | HEART RATE: 40 BPM | SYSTOLIC BLOOD PRESSURE: 132 MMHG | OXYGEN SATURATION: 95 % | RESPIRATION RATE: 20 BRPM

## 2024-10-08 DIAGNOSIS — B35.1 ONYCHOMYCOSIS: ICD-10-CM

## 2024-10-08 DIAGNOSIS — M76.71 PERONEAL TENDINITIS, RIGHT: ICD-10-CM

## 2024-10-08 DIAGNOSIS — M76.71 PERONEAL TENDINITIS, RIGHT: Primary | ICD-10-CM

## 2024-10-08 PROCEDURE — 11721 DEBRIDE NAIL 6 OR MORE: CPT | Performed by: PODIATRIST

## 2024-10-08 PROCEDURE — 99203 OFFICE O/P NEW LOW 30 MIN: CPT | Mod: 25 | Performed by: PODIATRIST

## 2024-10-08 PROCEDURE — 73630 X-RAY EXAM OF FOOT: CPT | Mod: RT | Performed by: PODIATRIST

## 2024-10-08 ASSESSMENT — PAIN SCALES - GENERAL: PAINLEVEL: NO PAIN (0)

## 2024-10-08 NOTE — PATIENT INSTRUCTIONS
Podiatry Clinics that offer foot and toenail care    Foot and Ankle Clinics, [facility]  [phone]     [facility]  [place] - Dr. Smith on Tuesdays  [phone]     [place] [facility]  [place]  [phone]     [facility] [place] [facility]  [facility] [place]  [phone]     [facility]  [phone]     [facility]  *Nurse comes to your home for nail care.  Evelyn Tovar RN Foot Specialist

## 2024-10-08 NOTE — LETTER
10/8/2024      Daquan Schmitt  [address] [address]      Dear Colleague,    Thank you for referring your patient, Daquan Schmitt, to the [facility]. Please see a copy of my visit note below.          FOOT AND ANKLE SURGERY/PODIATRY CONSULT NOTE         ASSESSMENT:   Insertional peroneal tendinitis right foot  Onychomycosis  Inésertoesther      TREATMENT:  -I reviewed the patient's right foot x-rays which are negative for fracture.    -Based on the above, we discussed that he likely does have insertional peroneal tendinitis along the base of the fifth metatarsal.  Reviewed treatment options including limited ambulation, no hiking for the next 4 to 6 weeks.      -Also reviewed anti-inflammatory medication including ibuprofen, naproxen or oral prednisone.  Patient would like to try we will begin 600 mg ibuprofen 3 times daily PC x7 days.    -Will consider cam boot and oral prednisone if symptoms persist.    -I debrided nails 1 through 10 in length and thickness    -Patient's questions invited and answered. He was encouraged to call my office with any further questions or concerns.     Alexandre Lerma DPM  [facility]/Foot & Ankle Surgery      HPI: I was asked to see Daquan Schmitt today for lateral right foot pain.  Patient reports he has had ongoing lateral right foot pain for several weeks.  Denies any known trauma but does admit being active on his feet including hiking.  He would also would like a nail trim today.    Past Medical History:   Diagnosis Date     Atrial fibrillation (H)      Coronary artery calcification seen on CT scan      Essential hypertension      Hyperlipidemia      MARY (obstructive sleep apnea)     on CPAP     Sensorineural hearing loss, bilateral          Social History     Socioeconomic History     Marital status:      Spouse name: Not on file     Number of children: Not on file     Years of education: Not on file      Highest education level: Not on file   Occupational History     Not on file   Tobacco Use     Smoking status: Former     Types: Cigarettes     Smokeless tobacco: Never   Vaping Use     Vaping status: Never Used   Substance and Sexual Activity     Alcohol use: Not on file     Drug use: Never     Sexual activity: Not on file   Other Topics Concern     Not on file   Social History Narrative     Not on file     Social Determinants of Health     Financial Resource Strain: Not on file   Food Insecurity: Not on file   Transportation Needs: Not on file   Physical Activity: Not on file   Stress: Not on file   Social Connections: Not on file   Interpersonal Safety: Not on file   Housing Stability: Not on file            Allergies   Allergen Reactions     Nickel Rash         MEDICATIONS:   Current Outpatient Medications   Medication Sig Dispense Refill     apixaban ANTICOAGULANT (ELIQUIS ANTICOAGULANT) 5 MG tablet Take 1 tablet by mouth twice daily 180 tablet 3     ascorbic acid (VITAMIN C) 500 MG CPCR CR capsule Take 500 mg by mouth daily       cetirizine (ZYRTEC) 10 MG tablet Take 10 mg by mouth daily       co-enzyme Q-10 100 MG CAPS capsule Take 100 mg by mouth daily       finasteride (PROSCAR) 5 MG tablet Take 1 tablet (5 mg) by mouth daily 30 tablet 1     lisinopril-hydrochlorothiazide (ZESTORETIC) 20-12.5 MG tablet Take 2 tablets by mouth daily       melatonin 5 MG CAPS Take 5 mg by mouth nightly as needed       pravastatin (PRAVACHOL) 40 MG tablet Take 40 mg by mouth daily       prednisoLONE acetate (PRED FORTE) 1 % ophthalmic suspension Place 1 drop into the right eye 3 times daily       sildenafil (REVATIO) 20 MG tablet Take 1 tablet by mouth daily as needed (only as needed not every day)       sotalol (BETAPACE) 80 MG tablet Take 0.5 tablets (40 mg) by mouth 2 times daily 90 tablet 3     No current facility-administered medications for this visit.        Family History   Problem Relation Age of Onset      Myocardial Infarction Mother 54     Aortic aneurysm Father 70     Aortic aneurysm Brother 63          Review of Systems - 10 point Review of Systems is negative except for right foot pain which is noted in HPI.    OBJECTIVE:  Appearance: alert, well appearing, and in no distress.    VITAL SIGNS: /74   Pulse (!) 40   Resp 20   SpO2 95%       General appearance: Patient is alert and fully cooperative with history & exam.  No sign of distress is noted during the visit.     Psychiatric: Affect is pleasant & appropriate.  Patient appears motivated to improve health.     Respiratory: Breathing is regular & unlabored while sitting.     HEENT: Hearing is intact to spoken word.  Speech is clear.  No gross evidence of visual impairment that would impact ambulation.      Vascular: Dorsalis pedis palpable. There is pedal hair growth bilateral.  CFT < 3 sec from anterior tibial surface to distal digits bilateral. There is no appreciable edema noted.  Dermatologic: Nails 1-10 elongated, thick, yellow with subungal debris. Trophic changes noted including diminished hair growth and shiny skin.  Neurologic: All epicritic and proprioceptive sensations are grossly intact bilateral.  Musculoskeletal: Pain to palpation at insertion of peroneal brevis tendon to fifth metatarsal base right foot and along base of fifth metatarsal right foot.        Again, thank you for allowing me to participate in the care of your patient.        Sincerely,        Alexandre Lerma DPM

## 2024-10-08 NOTE — PROGRESS NOTES
FOOT AND ANKLE SURGERY/PODIATRY CONSULT NOTE         ASSESSMENT:   Insertional peroneal tendinitis right foot  Onychomycosis  Viky      TREATMENT:  -I reviewed the patient's right foot x-rays which are negative for fracture.    -Based on the above, we discussed that he likely does have insertional peroneal tendinitis along the base of the fifth metatarsal.  Reviewed treatment options including limited ambulation, no hiking for the next 4 to 6 weeks.      -Also reviewed anti-inflammatory medication including ibuprofen, naproxen or oral prednisone.  Patient would like to try we will begin 600 mg ibuprofen 3 times daily PC x7 days.    -Will consider cam boot and oral prednisone if symptoms persist.    -I debrided nails 1 through 10 in length and thickness    -Patient's questions invited and answered. He was encouraged to call my office with any further questions or concerns.     Alexandre Lerma DPM  [facility] Podiatry/Foot & Ankle Surgery      HPI: I was asked to see Daquan Schmitt today for lateral right foot pain.  Patient reports he has had ongoing lateral right foot pain for several weeks.  Denies any known trauma but does admit being active on his feet including hiking.  He would also would like a nail trim today.    Past Medical History:   Diagnosis Date    Atrial fibrillation (H)     Coronary artery calcification seen on CT scan     Essential hypertension     Hyperlipidemia     MARY (obstructive sleep apnea)     on CPAP    Sensorineural hearing loss, bilateral          Social History     Socioeconomic History    Marital status:      Spouse name: Not on file    Number of children: Not on file    Years of education: Not on file    Highest education level: Not on file   Occupational History    Not on file   Tobacco Use    Smoking status: Former     Types: Cigarettes    Smokeless tobacco: Never   Vaping Use    Vaping status: Never Used   Substance and Sexual Activity    Alcohol use: Not  on file    Drug use: Never    Sexual activity: Not on file   Other Topics Concern    Not on file   Social History Narrative    Not on file     Social Determinants of Health     Financial Resource Strain: Not on file   Food Insecurity: Not on file   Transportation Needs: Not on file   Physical Activity: Not on file   Stress: Not on file   Social Connections: Not on file   Interpersonal Safety: Not on file   Housing Stability: Not on file            Allergies   Allergen Reactions    Nickel Rash         MEDICATIONS:   Current Outpatient Medications   Medication Sig Dispense Refill    apixaban ANTICOAGULANT (ELIQUIS ANTICOAGULANT) 5 MG tablet Take 1 tablet by mouth twice daily 180 tablet 3    ascorbic acid (VITAMIN C) 500 MG CPCR CR capsule Take 500 mg by mouth daily      cetirizine (ZYRTEC) 10 MG tablet Take 10 mg by mouth daily      co-enzyme Q-10 100 MG CAPS capsule Take 100 mg by mouth daily      finasteride (PROSCAR) 5 MG tablet Take 1 tablet (5 mg) by mouth daily 30 tablet 1    lisinopril-hydrochlorothiazide (ZESTORETIC) 20-12.5 MG tablet Take 2 tablets by mouth daily      melatonin 5 MG CAPS Take 5 mg by mouth nightly as needed      pravastatin (PRAVACHOL) 40 MG tablet Take 40 mg by mouth daily      prednisoLONE acetate (PRED FORTE) 1 % ophthalmic suspension Place 1 drop into the right eye 3 times daily      sildenafil (REVATIO) 20 MG tablet Take 1 tablet by mouth daily as needed (only as needed not every day)      sotalol (BETAPACE) 80 MG tablet Take 0.5 tablets (40 mg) by mouth 2 times daily 90 tablet 3     No current facility-administered medications for this visit.        Family History   Problem Relation Age of Onset    Myocardial Infarction Mother 54    Aortic aneurysm Father 70    Aortic aneurysm Brother 63          Review of Systems - 10 point Review of Systems is negative except for right foot pain which is noted in HPI.    OBJECTIVE:  Appearance: alert, well appearing, and in no distress.    VITAL  SIGNS: /74   Pulse (!) 40   Resp 20   SpO2 95%       General appearance: Patient is alert and fully cooperative with history & exam.  No sign of distress is noted during the visit.     Psychiatric: Affect is pleasant & appropriate.  Patient appears motivated to improve health.     Respiratory: Breathing is regular & unlabored while sitting.     HEENT: Hearing is intact to spoken word.  Speech is clear.  No gross evidence of visual impairment that would impact ambulation.      Vascular: Dorsalis pedis palpable. There is pedal hair growth bilateral.  CFT < 3 sec from anterior tibial surface to distal digits bilateral. There is no appreciable edema noted.  Dermatologic: Nails 1-10 elongated, thick, yellow with subungal debris. Trophic changes noted including diminished hair growth and shiny skin.  Neurologic: All epicritic and proprioceptive sensations are grossly intact bilateral.  Musculoskeletal: Pain to palpation at insertion of peroneal brevis tendon to fifth metatarsal base right foot and along base of fifth metatarsal right foot.

## 2024-11-05 DIAGNOSIS — I48.19 PERSISTENT ATRIAL FIBRILLATION (H): ICD-10-CM

## 2024-11-07 RX ORDER — SOTALOL HYDROCHLORIDE 80 MG/1
40 TABLET ORAL
Qty: 90 TABLET | Refills: 3 | Status: SHIPPED | OUTPATIENT
Start: 2024-11-07

## 2024-11-08 NOTE — TELEPHONE ENCOUNTER
Charles Rose MD  P [facility] Ep Support [facility]  Caller: Unspecified (1 month ago)  Hi team,  Patient sotalol dose was cut to 4 mg twice daily today.  Patient has an elevated [id number] score of at least 4 and needs to remain on oral anticoagulant therapy.  Agree with follow-up with EP TERRANCE in January.  At that point further discussion regarding possible mapping/ablation of his arrhythmia.  Thanks  Charles

## 2024-11-24 ENCOUNTER — HEALTH MAINTENANCE LETTER (OUTPATIENT)
Age: 79
End: 2024-11-24

## 2025-01-06 DIAGNOSIS — I48.91 ATRIAL FIBRILLATION WITH RVR (H): ICD-10-CM

## 2025-01-20 ENCOUNTER — TELEPHONE (OUTPATIENT)
Dept: CARDIOLOGY | Facility: CLINIC | Age: 80
End: 2025-01-20
Payer: COMMERCIAL

## 2025-01-20 NOTE — TELEPHONE ENCOUNTER
[facility] Call Center    Phone Message    May a detailed message be left on voicemail: yes     Reason for Call: Other: [facility] is requesting verbal 2 day hold and bridging order for Eliquis prior to colonoscopy on 02/28. Hold to start 12/26. If unable to do a hold, they are requesting pt's most recent (within 90 days) creatinine levels be faxed to [phone] with drawn date. Thank you!       Action Taken: Message routed to:  Other: [facility] [[id number]]    Travel Screening: Not Applicable     Date of Service:                                                                     
Okay to hold as requested, no bridging.  Resume as soon as possible after colonoscopy
Request for Anticoagulation Interruption    Procedure: Colonoscopy  Requested hold time from facility: 2 days prior  Date of procedure:  2/28/25  Anticoagulation medication: Eliquis  [id number] score: 4  CrCl, mL/min: 96.84  Previous Procedures: Pt has not had hx of recent PVI in the past 3mo, does not have anticipated PVI within the next 1 mo, DCCV in the past 4 wks, and/or LAAO- restricting interruption in AC  Guidelines: Low Risk (Eliquis, Xarelto) with CrCl ml/min: > or = to 30 discontinue > or = to  24hrs, 15-29 discontinue > or = to 36hrs, <15 No Data consider anti Xa level an/or > or = to 48 hrs. Uncertain/Intermediate/High Risk (Eliquis, Xarelto) with CrCl ml/min: > or = to 30 discontinue > or = to 48 hrs, < 30 No data consider anti Xa level and/or > or = to 72 hrs    lease advise hold orders, with or without need for bridging  Thanks you,  1/20/2025 12:33 PM  Julisa Luke RN     CrCl Calculator Cockcroft-Gault Equation- Micromedex    2017 ACC Expert ConsensusDecision Pathway for PeriproceduralManagement of Anticoagulation inPatients With Nonvalvular Atrial Fibrillation    male  79 year old  Wt Readings from Last 1 Encounters:   07/10/24 114.3 kg (252 lb)     Most Recent 3 BMP's:  Recent Labs   Lab Test 04/03/24  0722 06/05/23  2338   NA  --  139   POTASSIUM  --  4.4   CHLORIDE  --  102   CO2  --  26   BUN  --  16   CR 1.1 1.00   ANIONGAP  --  11   ZAIDA  --  9.7   GLC  --  137*       
Routed orders to IRAJ   
1-2 drinks

## 2025-01-21 ENCOUNTER — OFFICE VISIT (OUTPATIENT)
Dept: CARDIOLOGY | Facility: CLINIC | Age: 80
End: 2025-01-21
Attending: INTERNAL MEDICINE
Payer: COMMERCIAL

## 2025-01-21 VITALS
DIASTOLIC BLOOD PRESSURE: 74 MMHG | OXYGEN SATURATION: 95 % | BODY MASS INDEX: 31.84 KG/M2 | WEIGHT: 248 LBS | HEART RATE: 41 BPM | SYSTOLIC BLOOD PRESSURE: 148 MMHG

## 2025-01-21 DIAGNOSIS — I48.19 PERSISTENT ATRIAL FIBRILLATION (H): Primary | ICD-10-CM

## 2025-01-21 DIAGNOSIS — I10 PRIMARY HYPERTENSION: ICD-10-CM

## 2025-01-21 PROCEDURE — G2211 COMPLEX E/M VISIT ADD ON: HCPCS | Performed by: NURSE PRACTITIONER

## 2025-01-21 PROCEDURE — 99214 OFFICE O/P EST MOD 30 MIN: CPT | Performed by: NURSE PRACTITIONER

## 2025-01-21 NOTE — PROGRESS NOTES
"[facility]  Cardiac Electrophysiology  [address]  [place] [address]   Office: [phone]  Fax: [phone]     HEART CARE ELECTROPHYSIOLOGY FOLLOW UP    Primary Care: Carmen Stout MD      Assessment/Recommendations     Persistent atrial fibrillation/stage 3B: Asymptomatic.  No documentation of recurrent AF following DCCV on sotalol 9/1/2023 but continues to have fatigue and poor activity tolerance secondary to beta blocker therapy. Trial off sotalol as previously outlined by Dr. Rose to reevaluate symptoms and time to AF recurrence     [id number] score of 4 for age >75, HTN, CAD; HAS BLED 2 for age >65, bleeding predisposition with recurrent epistaxis. He is interested in percutaneous left atrial appendage closure.  We discussed procedure, indications, risks and medication changes thereafter    Sinus node dysfunction: as above, limiting medical therapy for atrial fibrillation.  Continue expectant management     MARY: Consistent use of CPAP    Plan:  Continue Eliquis 5 mg twice a day for stroke prophylaxis  Decrease sotalol to 40 mg once a day x 14 days then discontinue  Follow-up 4-5 months      History of Present Illness/Subjective    Daquan Schmitt is a 79 year old male with past medical history significant for persistent atrial fibrillation, sinus node dysfunction, HTN, nonobstructive CAD, dyslipidemia, MARY on CPAP, prediabetes, seen today for EP follow-up.  He has been on sotalol since DCCV fall 2023. His biggest concern has been ongoing fatigue, which had not significantly improved in sinus rhythm, suspected secondary to chronotropic incompetence. An ambulatory monitor August 2024 showed sinus rates up to 113 bpm without high grade AV block, profound bradycardia, sinus pauses or symptom triggers. There was also no atrial fibrillation or significant tachyarrhythmias captured. He continues to feel \"zapped\" with low energy and needs more rest than usual. " His heart rates range in the 40s at home. He is hoping to tap maple syrup in the next few months and is looking forward to spending some time in [place] next month. He denies chest discomfort, palpitations, shortness of breath, lightheadedness/dizziness, pedal edema, or syncope.     Data Review     Arrhythmia hx  Sx: asymptomatic  Dx/date: Persistent AF 6/5/2023 first detected by elevated heart rates, PersAF by Holter 6/20/2023  AAD: sotalol 9/2023-present (fatigue)   DCCV: 9/1/2023  Ablation: none  [id number] 4 for age >75, HTN, CAD  OAC: Apixaban    EKG 9/1/2023: SB 47 bpm, QRS 92 ms, QT/QTc 462/408 ms  Personally reviewed.     TTE 7/25/2023  The left ventricle is normal in size.  Left ventricular function is normal.The ejection fraction is 55-60%.  The right ventricle is mildly dilated.  Mildly decreased right ventricular systolic function  The left atrium is moderately dilated.  There is mild (1+) mitral regurgitation.  Sclerosis of the aortic valve. Aortic valve not well visualized.    Zio monitoring from 8/7/2024 to 8/14/2024 (duration 7d 3h).  Predominant rhythm was sinus rhythm, 31 (8/13/2024 11:10a) to 113bpm, average 54bpm.  7 episodes of nonsustained supraventricular tachycardia - longest 11 beats, fastest 152bpm.  No sustained tachyarrhythmias.  No atrial fibrillation.  There were no pauses of greater than 3 seconds.  Rare supraventricular ectopic beats (isolated <1%).  Rare premature ventricular contractions (isolated <1%).  No symptoms recorded.       I have reviewed and updated the patient's past medical history, allergy list and medication list.          Physical Examination   BMI= Body mass index is 31.84 kg/m .    Wt Readings from Last 3 Encounters:   01/21/25 112.5 kg (248 lb)   07/10/24 114.3 kg (252 lb)   04/03/24 113.4 kg (250 lb)       Vitals: BP (!) 148/74 (BP Location: Left arm, Patient Position: Sitting, Cuff Size: Adult Large)   Pulse (!) 41   Wt 112.5 kg (248 lb)   SpO2 95%   BMI  31.84 kg/m    General   Appearance:   Alert and oriented, in no acute distress   HEENT:  Normocephalic and atraumatic   Neck: No JVP, carotid bruit or obvious thyromegaly   Lungs:   Respirations unlabored   Cardiovascular:   Rhythm is regular. S1 and S2 are normal. No significant murmur is present   Extremities: No cyanosis or clubbing   Skin: Skin is warm, dry, and otherwise intact   Neurologic: Gait not assessed. Mood and affect appropriate           Medical History  Surgical History Family History Social History   Past Medical History:   Diagnosis Date    Atrial fibrillation (H)     Coronary artery calcification seen on CT scan     Essential hypertension     Hyperlipidemia     MARY (obstructive sleep apnea)     on CPAP    Sensorineural hearing loss, bilateral     Past Surgical History:   Procedure Laterality Date    PENILE ADHESIONS LYSIS      VASECTOMY      Family History   Problem Relation Age of Onset    Myocardial Infarction Mother 54    Aortic aneurysm Father 70    Aortic aneurysm Brother 63    Social History     Socioeconomic History    Marital status:      Spouse name: Not on file    Number of children: Not on file    Years of education: Not on file    Highest education level: Not on file   Occupational History    Not on file   Tobacco Use    Smoking status: Former     Types: Cigarettes    Smokeless tobacco: Never   Vaping Use    Vaping status: Never Used   Substance and Sexual Activity    Alcohol use: Not on file    Drug use: Never    Sexual activity: Not on file   Other Topics Concern    Not on file   Social History Narrative    Not on file     Social Drivers of Health     Financial Resource Strain: Not on file   Food Insecurity: Not on file   Transportation Needs: Not on file   Physical Activity: Not on file   Stress: Not on file   Social Connections: Not on file   Interpersonal Safety: Not on file   Housing Stability: Not on file          Medications  Allergies   Scheduled Meds:  Current  Outpatient Medications   Medication Sig Dispense Refill    apixaban ANTICOAGULANT (ELIQUIS ANTICOAGULANT) 5 MG tablet Take 1 tablet (5 mg) by mouth 2 times daily. 180 tablet 3    ascorbic acid (VITAMIN C) 500 MG CPCR CR capsule Take 500 mg by mouth daily      cetirizine (ZYRTEC) 10 MG tablet Take 10 mg by mouth daily      co-enzyme Q-10 100 MG CAPS capsule Take 100 mg by mouth daily      finasteride (PROSCAR) 5 MG tablet Take 1 tablet (5 mg) by mouth daily 30 tablet 1    lisinopril-hydrochlorothiazide (ZESTORETIC) 20-12.5 MG tablet Take 2 tablets by mouth daily      melatonin 5 MG CAPS Take 5 mg by mouth nightly as needed      pravastatin (PRAVACHOL) 40 MG tablet Take 40 mg by mouth daily      sildenafil (REVATIO) 20 MG tablet Take 1 tablet by mouth daily as needed (only as needed not every day)      sotalol (BETAPACE) 80 MG tablet Take 1/2 (one-half) tablet by mouth twice daily 90 tablet 3    prednisoLONE acetate (PRED FORTE) 1 % ophthalmic suspension Place 1 drop into the right eye 3 times daily (Patient not taking: Reported on 1/21/2025)      Allergies   Allergen Reactions    Nickel Rash         Lab Results    Chemistry/lipid CBC Cardiac Enzymes/BNP/TSH/INR   Lab Results   Component Value Date    BUN 16 06/05/2023     06/05/2023    CO2 26 06/05/2023    Lab Results   Component Value Date    WBC 10.0 06/05/2023    HGB 15.2 06/05/2023    HCT 43.8 06/05/2023    MCV 91 06/05/2023     06/05/2023    @RESUFAST(BMP,CBC,BNP,TSH,  INR)@      40 minutes spent reviewing prior records (including documentation, laboratory studies, cardiac testing/imaging), history and physical exam, planning, and subsequent documentation.     The longitudinal plan of care for the diagnosis(es)/condition(s) as documented were addressed during this visit. Due to the added complexity in care, I will continue to support Daquan in the subsequent management and with ongoing continuity of care.      This note has been dictated using  voice recognition software. Any grammatical, typographical, or context distortions are unintentional and inherent to the software.    Zee Garcia CNP  Cardiac Electrophysiology  [facility]  Clinic and [phone]  Fax: [phone]  Electrophysiology Nurses: [phone]

## 2025-03-06 ENCOUNTER — TELEPHONE (OUTPATIENT)
Dept: CARDIOLOGY | Facility: CLINIC | Age: 80
End: 2025-03-06
Payer: COMMERCIAL

## 2025-03-06 NOTE — TELEPHONE ENCOUNTER
Request for Anticoagulation Interruption    Procedure: Colonoscopy  Requested hold time from facility: 2 days prior  Date of procedure:  4/14  Anticoagulation medication: Eliquis  [id number] score: 4  CrCl, mL/min: 95.30  Previous Procedures: Pt has not had hx of recent PVI in the past 3mo, does not have anticipated PVI within the next 1 mo, DCCV in the past 4 wks, and/or LAAO- restricting interruption in AC  Guidelines: Low Risk (Eliquis, Xarelto) with CrCl ml/min: > or = to 30 discontinue > or = to  24hrs, 15-29 discontinue > or = to 36hrs, <15 No Data consider anti Xa level an/or > or = to 48 hrs. Uncertain/Intermediate/High Risk (Eliquis, Xarelto) with CrCl ml/min: > or = to 30 discontinue > or = to 48 hrs, < 30 No data consider anti Xa level and/or > or = to 72 hrs    lease advise hold orders, with or without need for bridging  Thanks you,  3/6/2025 11:17 AM  Julisa Luke RN     CrCl Calculator Cockcroft-Gault Equation- Micromedex    2017 ACC Expert ConsensusDecision Pathway for PeriproceduralManagement of Anticoagulation inPatients With Nonvalvular Atrial Fibrillation    male  79 year old  Wt Readings from Last 1 Encounters:   01/21/25 112.5 kg (248 lb)     Most Recent 3 BMP's:  Recent Labs   Lab Test 04/03/24  0722 06/05/23  2338   NA  --  139   POTASSIUM  --  4.4   CHLORIDE  --  102   CO2  --  26   BUN  --  16   CR 1.1 1.00   ANIONGAP  --  11   ZAIDA  --  9.7   GLC  --  137*

## 2025-03-06 NOTE — TELEPHONE ENCOUNTER
Zee Garcia, APRN CNP  P [facility] Ep Support Pool - [facility]  Caller: Unspecified (Today, 10:51 AM)  Agree with hold as stated, no bridging, resume asap post procedure. Thank you      Spoke with RN from [facility] and gave instructions. RN confirmed she will contact pt.    Julisa Luke RN

## 2025-03-06 NOTE — TELEPHONE ENCOUNTER
[facility] Call Center    Phone Message    May a detailed message be left on voicemail: yes     Reason for Call: Other: [facility] is requesting verbal 2 day hold and bridging order for Eliquis prior to colonoscopy on 04/14. Hold to start 04/12. If unable to do a hold, they are requesting pt's most recent (within 90 days) creatinine levels be faxed to [phone] with drawn date. Thank you!      Action Taken: Message routed to:  Other: [facility] [[id number]]    Travel Screening: Not Applicable     Date of Service:

## (undated) RX ORDER — METHOHEXITAL IN WATER/PF 100MG/10ML
SYRINGE (ML) INTRAVENOUS
Status: DISPENSED
Start: 2023-09-01